# Patient Record
Sex: MALE | Race: WHITE | Employment: OTHER | URBAN - METROPOLITAN AREA
[De-identification: names, ages, dates, MRNs, and addresses within clinical notes are randomized per-mention and may not be internally consistent; named-entity substitution may affect disease eponyms.]

---

## 2017-02-03 ENCOUNTER — TELEPHONE (OUTPATIENT)
Dept: FAMILY MEDICINE | Facility: CLINIC | Age: 67
End: 2017-02-03

## 2017-02-03 NOTE — TELEPHONE ENCOUNTER
Reason for Call:  Other call back    Detailed comments: pt is taking OMEPRAZOLE PO (Discontinued  Pt wants to know if he needs a B12 shot because  Of the medication he is taking. Also wants to know if  Getting the shot is beneficial    Phone Number Patient can be reached at: Home number on file [phone] (home)    Best Time: anytime    Can we leave a detailed message on this number? YES    Call taken on 2/3/2017 at 3:40 PM by Elissa Obrien

## 2017-02-03 NOTE — TELEPHONE ENCOUNTER
"To PCP:  Patient currently in [place] and will be back at the end of April.  States he tried Zantac but it didn't help his GERD symptoms.  Restarted Omeprazole.  Still has many concerns about long time use of Omeprazole.  Also, concerns he is having problems with nutrient (Vitamin) intake especially Vit B12.  Has noted episodes of \"skipped beats\", mainly at rest/night.  [facility] and [facility] WNL. No other testing done.  Feels that the Skipped beats is more prevalent than when first noted.  Asking if Vitamin deficiency could be causing symptom.  Denies any other symptoms associated with skipped beats or B12 deficiency.  Recommended that it would be best to see you when he returns.  He is asking thoughts in the interim.  Please advise.  Thank you.  Elis Devi RN      "

## 2017-02-06 NOTE — TELEPHONE ENCOUNTER
Patient notified with information noted below from provider and patient states that he did schedule an appointment with cardiology in [place] to follow up with this concern due to his location.   Sivan Sow RN  [facility] [employer]

## 2017-02-24 ENCOUNTER — TRANSFERRED RECORDS (OUTPATIENT)
Dept: HEALTH INFORMATION MANAGEMENT | Facility: CLINIC | Age: 67
End: 2017-02-24

## 2017-02-24 LAB
ALT SERPL-CCNC: 23 IU/L (ref 9–46)
AST SERPL-CCNC: 19 IU/L (ref 10–35)
CHOLEST SERPL-MCNC: 209 MG/DL (ref 125–200)
CREAT SERPL-MCNC: 0.98 MG/DL (ref 0.7–1.25)
GFR SERPL CREATININE-BSD FRML MDRD: 79 ML/MIN/1.73M2
GLUCOSE SERPL-MCNC: 88 MG/DL (ref 65–99)
HDLC SERPL-MCNC: 67 MG/DL (ref 40–199)
LDLC SERPL CALC-MCNC: 130 MG/DL
NONHDLC SERPL-MCNC: 142 MG/DL
POTASSIUM SERPL-SCNC: 4.7 MMOL/L (ref 3.5–5.3)
TRIGL SERPL-MCNC: 61 MG/DL
TSH SERPL-ACNC: 2.48 MIU/L (ref 0.4–4.5)

## 2017-05-10 ENCOUNTER — TELEPHONE (OUTPATIENT)
Dept: FAMILY MEDICINE | Facility: CLINIC | Age: 67
End: 2017-05-10

## 2017-05-10 DIAGNOSIS — K21.9 GASTROESOPHAGEAL REFLUX DISEASE, ESOPHAGITIS PRESENCE NOT SPECIFIED: Primary | ICD-10-CM

## 2017-05-10 NOTE — TELEPHONE ENCOUNTER
Reason for Call:  Other call back    Detailed comments: Pt called in regards to potentially getting a referral to a GI specialist recommended by Dr. Cruz. He also has questions in regards to his omeprazole. I asked him whether he wanted to make an appointment to see Dr. Cruz or if he just wanted a referral out. Pt is not against making an appointment to see Dr. Cruz, but would like a phone call back in regards to making an appointment or getting that referral.     Phone Number Patient can be reached at: Home number on file [phone] (home)    Best Time:     Can we leave a detailed message on this number? YES    Call taken on 5/10/2017 at 2:48 PM by Marlen Huff

## 2017-05-10 NOTE — TELEPHONE ENCOUNTER
To PCP:  Do you need to see patient for GI Referral?  Patient would like to see a GI specialist for GERD symptoms.  States since he has been on PPI for a long time, he would prefer to see GI to ensure long term GERD caused any new problems.  States he has been experiencing intermittent stomach aches that can last up to 5 days.   Denies pain, nausea, fevers.  Further states he saw ENT back when that thought cough, etc was due to GERD and recommended PPI.  Please advise.  Thank you.  Elis Devi RN

## 2017-06-06 ENCOUNTER — HOSPITAL ENCOUNTER (OUTPATIENT)
Facility: CLINIC | Age: 67
Discharge: HOME | End: 2017-06-06
Attending: SPECIALIST | Admitting: SPECIALIST
Payer: MEDICARE

## 2017-06-06 ENCOUNTER — SURGERY (OUTPATIENT)
Age: 67
End: 2017-06-06

## 2017-06-06 ENCOUNTER — TRANSFERRED RECORDS (OUTPATIENT)
Dept: HEALTH INFORMATION MANAGEMENT | Facility: CLINIC | Age: 67
End: 2017-06-06

## 2017-06-06 VITALS
OXYGEN SATURATION: 95 % | SYSTOLIC BLOOD PRESSURE: 99 MMHG | HEIGHT: 74 IN | DIASTOLIC BLOOD PRESSURE: 61 MMHG | BODY MASS INDEX: 20.79 KG/M2 | WEIGHT: 162 LBS | RESPIRATION RATE: 15 BRPM

## 2017-06-06 LAB — UPPER GI ENDOSCOPY: NORMAL

## 2017-06-06 PROCEDURE — G0500 MOD SEDAT ENDO SERVICE >5YRS: HCPCS | Performed by: SPECIALIST

## 2017-06-06 PROCEDURE — 88305 TISSUE EXAM BY PATHOLOGIST: CPT | Performed by: SPECIALIST

## 2017-06-06 PROCEDURE — 88305 TISSUE EXAM BY PATHOLOGIST: CPT | Mod: 26 | Performed by: SPECIALIST

## 2017-06-06 PROCEDURE — 43239 EGD BIOPSY SINGLE/MULTIPLE: CPT | Performed by: SPECIALIST

## 2017-06-06 PROCEDURE — 25000125 ZZHC RX 250: Performed by: SPECIALIST

## 2017-06-06 PROCEDURE — 25000128 H RX IP 250 OP 636: Performed by: SPECIALIST

## 2017-06-06 PROCEDURE — A9270 NON-COVERED ITEM OR SERVICE: HCPCS | Performed by: SPECIALIST

## 2017-06-06 RX ORDER — ONDANSETRON 2 MG/ML
4 INJECTION INTRAMUSCULAR; INTRAVENOUS
Status: DISCONTINUED | OUTPATIENT
Start: 2017-06-06 | End: 2017-06-06 | Stop reason: HOSPADM

## 2017-06-06 RX ORDER — LIDOCAINE 40 MG/G
CREAM TOPICAL
Status: DISCONTINUED | OUTPATIENT
Start: 2017-06-06 | End: 2017-06-06 | Stop reason: HOSPADM

## 2017-06-06 RX ORDER — FENTANYL CITRATE 50 UG/ML
INJECTION, SOLUTION INTRAMUSCULAR; INTRAVENOUS PRN
Status: DISCONTINUED | OUTPATIENT
Start: 2017-06-06 | End: 2017-06-06 | Stop reason: HOSPADM

## 2017-06-06 RX ADMIN — FENTANYL CITRATE 25 MCG: 50 INJECTION, SOLUTION INTRAMUSCULAR; INTRAVENOUS at 10:47

## 2017-06-06 RX ADMIN — FENTANYL CITRATE 25 MCG: 50 INJECTION, SOLUTION INTRAMUSCULAR; INTRAVENOUS at 10:41

## 2017-06-06 RX ADMIN — FENTANYL CITRATE 50 MCG: 50 INJECTION, SOLUTION INTRAMUSCULAR; INTRAVENOUS at 10:38

## 2017-06-06 RX ADMIN — MIDAZOLAM HYDROCHLORIDE 0.5 MG: 1 INJECTION, SOLUTION INTRAMUSCULAR; INTRAVENOUS at 10:41

## 2017-06-06 RX ADMIN — MIDAZOLAM HYDROCHLORIDE 0.5 MG: 1 INJECTION, SOLUTION INTRAMUSCULAR; INTRAVENOUS at 10:47

## 2017-06-06 RX ADMIN — SIMETHICONE 2 ML: 63.3; 3.7 SOLUTION/ DROPS ORAL at 10:14

## 2017-06-06 RX ADMIN — MIDAZOLAM HYDROCHLORIDE 1 MG: 1 INJECTION, SOLUTION INTRAMUSCULAR; INTRAVENOUS at 10:38

## 2017-06-06 NOTE — BRIEF OP NOTE
[facility] Brief Operative Note    Pre-operative diagnosis: reflux   Post-operative diagnosis Focally irregular Z line, otherwise normal.      Procedure: Procedure(s):  gastroscopy - Wound Class: II-Clean Contaminated   Surgeon(s): Surgeon(s) and Role:     * Jefferson Palmer MD - Primary   Estimated blood loss: * No values recorded between 6/6/2017 12:00 AM and 6/6/2017 10:58 AM *    Specimens:   ID Type Source Tests Collected by Time Destination   A : squamo columnar Biopsy Esophagus SURGICAL PATHOLOGY EXAM Jefferson Palmer MD 6/6/2017 10:48 AM       Findings: Please see ProVation procedure note in Chart Review

## 2017-06-07 LAB — COPATH REPORT: NORMAL

## 2017-06-14 ENCOUNTER — TELEPHONE (OUTPATIENT)
Dept: FAMILY MEDICINE | Facility: CLINIC | Age: 67
End: 2017-06-14

## 2017-06-14 NOTE — TELEPHONE ENCOUNTER
I called patient and scheduled patient for tomorrow with Edgardo  Patient would like to have B12 lab drawn tomorrow as he does not believe that has ever been do  Patient understands that Edgardo would be the one to order if appropriate.     Patient also anxiously awaiting EGD results. Looks like they are finalized under Procedure tab  Would like to know results once Dr. Cruz has time to review.   Will route to Dr. Cruz about EGD results.     Adamaris Holcomb RN

## 2017-06-14 NOTE — TELEPHONE ENCOUNTER
"I called patient and spoke with him.   Reporting of lightheadedness that is not constant  He notices lightheadedness/\"pressure headache\"/\"jolt to head\" when he bends down, coughs, lowers head low to ground, and puts pressure on chest  Noticed symptoms about 1 month ago, but seems to be happening more frequently over last 1 week.   The lightheadedness/\"pressure headache\"/\"jolt to head\" resolves as soon as he stands back up. Only a matter of seconds.   Patient does take daily walks for about 1/2 hour and has no issue with that.   Does report of chest discomfort, but attributing it to endoscopy procedure that was done last Tuesday.   No feeling of heart palpitations but again says that he had palpitations back in January but that they resolved.   No weakness or numbness down one side of body  No SOB    He has been taking omeprazole for 3 years and would like to try and go off of it to see if it could be causing these episodes of lightheadedness  Patient wonders if he is getting enough B 12?    Patient would like to see Dr. Cruz sooner rather than later to go over Endoscopy results  I offered Team appointment, but patient would prefer to see Dr. Cruz due to many concerns and would like to go over results of Endoscopy once Dr. Cruz has a chance to review.     Dr. Cruz, please advise.     Adamaris Holcomb RN        "

## 2017-06-14 NOTE — TELEPHONE ENCOUNTER
"Reason for call:  Patient reporting a symptom    Symptom or request: Lightheaded and pressure headache \"like a flash\" when he bends over and has pressure on stomach/chest area.     Duration (how long have symptoms been present): about a month    Have you been treated for this before? No    Additional comments: first appointment not until 7/21.      Phone Number patient can be reached at:  Cell number on file:    Telephone Information:   Mobile [phone]     Best Time:  any    Can we leave a detailed message on this number:  YES    Call taken on 6/14/2017 at 10:17 AM by Raine Tejada    "

## 2017-06-14 NOTE — TELEPHONE ENCOUNTER
I might be able to see him next week, in a same day slot, but with those symptoms he should be seen sooner, by team provider tomorrow or next (sorry my schedule is very full)

## 2017-06-15 ENCOUNTER — OFFICE VISIT (OUTPATIENT)
Dept: FAMILY MEDICINE | Facility: CLINIC | Age: 67
End: 2017-06-15
Payer: COMMERCIAL

## 2017-06-15 VITALS
DIASTOLIC BLOOD PRESSURE: 70 MMHG | TEMPERATURE: 96.8 F | SYSTOLIC BLOOD PRESSURE: 109 MMHG | HEIGHT: 74 IN | BODY MASS INDEX: 21.46 KG/M2 | HEART RATE: 64 BPM | WEIGHT: 167.2 LBS | OXYGEN SATURATION: 99 %

## 2017-06-15 DIAGNOSIS — R51.9 PRESSURE IN HEAD: Primary | ICD-10-CM

## 2017-06-15 DIAGNOSIS — K21.9 GASTROESOPHAGEAL REFLUX DISEASE WITHOUT ESOPHAGITIS: ICD-10-CM

## 2017-06-15 DIAGNOSIS — R07.89 CHEST DISCOMFORT: ICD-10-CM

## 2017-06-15 PROCEDURE — 99214 OFFICE O/P EST MOD 30 MIN: CPT | Performed by: NURSE PRACTITIONER

## 2017-06-15 ASSESSMENT — ENCOUNTER SYMPTOMS
DIZZINESS: 1
TINGLING: 0
PALPITATIONS: 1
FEVER: 0
HEADACHES: 1
MEMORY LOSS: 1
INSOMNIA: 0
SHORTNESS OF BREATH: 1
ROS GI COMMENTS: HX OF GERD.

## 2017-06-15 NOTE — PROGRESS NOTES
"HPI    SUBJECTIVE:                                                    Dizziness  Urbano Magallanes is a 66 year old male who presents to clinic today for the following health issues:    Chief Complaint   Patient presents with     Dizziness     pressure headache x 1month, seen a cardiologist due to skipping in [facility] and they did a work up on him in March, he has slight discomfort in chest mid chest area       Lightheadedness    Duration: 1 month    Description   Feeling faint:  no   Feeling like the surroundings are moving: no   Loss of consciousness or falls: no     Intensity:  moderate    Accompanying signs and symptoms:   Nausea/vomitting: no   Palpitations: no   Weakness in arms or legs: no   Vision or speech changes: no   Ringing in ears (Tinnitus): YES- has tinnitus past 4 years  Hearing loss related to dizziness: no   Other (fevers/chills/sweating/dyspnea): no     History (similar episodes/head trauma/previous evaluation/recent bleeding): None    Precipitating or alleviating factors (new meds/chemicals): None  Worse with activity/head movement: no     Therapies tried and outcome: Ibuprofen/aspirin     Since last year, he noticed his heart skipped beat intermittently. While exercising, he took his pulse [id number] and his heart rate continued to skip beats. His symptoms got worse last winter when he laid down. He saw a cardiologist in March 2017 in [place] and they did echocardiogram and found \"a leak\" that was not concerning. His other labs were normal but wonders why his B12 level was not tested.  He started taking supplements on his own and his abnormal heart rythm went away.   Last month, he started to feel chest discomfort that got worse last week. He gets it daily for several hours and it resolves on its own. Nothing specific brings it on. The pain is specifically on the lower sternum. At the moment, he denies chest pain, palpitation and SOB at rest, but he still has slight chest discomfort.  He reported " "some SOB with activities that is mild.   [facility]. He eats healthy and tries to eat 4 hrs before bed. He had a recent endoscopy which has exacerbated his chest pain. [facility] and [place] will be scanned in chart.   He also complaint of pressure headache for 3 weeks. Coughing and chest pressure makes his headache worse. No hx migraines but has had some HA in the past. Only gets it if he does something that causes it. Bending over causes the pressure and lightheadedness. This is a global pain rated about 8/10 lasting for about 30 seconds, then significantly decreases but doesn't resolve and lingers for a few hours and \"isn't normal.\" He denies vision changes, diet changes, b/b changes , paresthesias and no recent URI but might have wax in his ears. His balance is slightly off. He has occasional stress and it is slightly worse this month.  He sleeps 7 hrs everyday. He thinks he has some more forgetfulness as well.    He is concern about getting acne on his face in the last 9 months.     Past Medical History:   Diagnosis Date     Cancer (H)     skin basal     GERD (gastroesophageal reflux disease)      Postoperative hernia     post hernia syndrome      Past Surgical History:   Procedure Laterality Date     APPENDECTOMY       ESOPHAGOSCOPY, GASTROSCOPY, DUODENOSCOPY (EGD), COMBINED N/A 6/6/2017    Procedure: COMBINED ESOPHAGOSCOPY, GASTROSCOPY, DUODENOSCOPY (EGD), BIOPSY SINGLE OR MULTIPLE;  gastroscopy;  Surgeon: Jefferson Palmer MD;  Location:  GI     HERNIA REPAIR  1982, 2003, 2015    [facility]      MOHS MICROGRAPHIC PROCEDURE      right scalp; basal cell carcinoma     Social History   Substance Use Topics     Smoking status: Never Smoker     Smokeless tobacco: Not on file     Alcohol use 0.0 oz/week     0 Standard drinks or equivalent per week      Comment: 1/week     Current Outpatient Prescriptions   Medication Sig Dispense Refill     OMEPRAZOLE PO    " "    ranitidine (ZANTAC) 150 MG tablet Take 1 tablet (150 mg) by mouth 2 times daily 180 tablet 3     VITAMIN D, CHOLECALCIFEROL, PO Take by mouth daily       Glucosamine HCl-MSM (MSM GLUCOSAMINE PO)        LOW-DOSE ASPIRIN PO        Allergies   Allergen Reactions     No Known Allergies        Reviewed and updated as needed this visit by clinical staff and provider      Review of Systems   Constitutional: Negative for fever.   HENT: Negative for congestion and sore throat.    Eyes: Negative.         No vision changes   Respiratory: Positive for shortness of breath.         SOB with activities.    Cardiovascular: Positive for chest pain and palpitations. Negative for leg swelling.        Not currently having chest pain and palpitation.   Gastrointestinal: Negative for nausea.        Hx of GERD.   Musculoskeletal: Negative for neck pain.   Neurological: Positive for dizziness and headaches. Negative for tingling.        Some dizziness   Psychiatric/Behavioral: Positive for memory loss. The patient does not have insomnia.          /70 (BP Location: Right arm, Patient Position: Chair, Cuff Size: Adult Regular)  Pulse 64  Temp 96.8  F (36  C) (Oral)  Ht 6' 2\" (1.88 m)  Wt 167 lb 3.2 oz (75.8 kg)  SpO2 99%  BMI 21.47 kg/m2      Physical Exam   Constitutional: He is oriented to person, place, and time and well-developed, well-nourished, and in no distress.   HENT:   Head: Normocephalic.   Right Ear: Tympanic membrane, external ear and ear canal normal.   Left Ear: Tympanic membrane, external ear and ear canal normal.   Eyes: Conjunctivae and EOM are normal. Pupils are equal, round, and reactive to light.   Neck: Normal range of motion.   Cardiovascular: Normal rate, regular rhythm and normal heart sounds.    No murmur heard.  Pulmonary/Chest: Effort normal and breath sounds normal. No respiratory distress.   Musculoskeletal: Normal range of motion. He exhibits no edema.   Neurological: He is alert and oriented " to person, place, and time. No cranial nerve deficit. Gait normal. Coordination normal.   Skin: Skin is warm and dry.   Psychiatric: Mood and affect normal.         Assessment and Plan:       ICD-10-CM    1. Pressure in head R51 MRA Angiogram Head w/o Contrast   2. Chest discomfort R07.89    3. Gastroesophageal reflux disease without esophagitis K21.9        Unknown etiology of multiple symptoms   Very unlikely to be any emergent etiology considering the historical features and longevity   Discussed option for MRI today to look for aneurysm, but he would prefer to watch and wait which is reasonable. He has a chronically low BP so likelihood is rather low to have an aneurysm.   Recommend that he f/u within 1 month with PCP for recheck is symptoms persist or sooner with worsening symptoms         BUDDY Pate, CNP  [facility]

## 2017-06-15 NOTE — TELEPHONE ENCOUNTER
Patient has a.m appointment with EYAD Bryan.  Can we make sure he gets the message below from  re: GERD?  Thank you,   Azucena Botello RN

## 2017-06-15 NOTE — MR AVS SNAPSHOT
"              After Visit Summary   6/15/2017    Urbano Magallanes    MRN: [id number]           Patient Information     Date Of Birth          1950        Visit Information        Provider Department      6/15/2017 9:30 AM Edgardo Murrieta APRN CNP [facility]        Today's Diagnoses     Pressure in head    -  1    Chest discomfort        Gastroesophageal reflux disease without esophagitis           Follow-ups after your visit        Who to contact     If you have questions or need follow up information about today's clinic visit or your schedule please contact [facility] directly at [phone].  Normal or non-critical lab and imaging results will be communicated to you by Womaihart, letter or phone within 4 business days after the clinic has received the results. If you do not hear from us within 7 days, please contact the clinic through Womai[employer] or phone. If you have a critical or abnormal lab result, we will notify you by phone as soon as possible.  Submit refill requests through [employer] or call your pharmacy and they will forward the refill request to us. Please allow 3 business days for your refill to be completed.          Additional Information About Your Visit        MyChart Information     In[employer] lets you send messages to your doctor, view your test results, renew your prescriptions, schedule appointments and more. To sign up, go to [email or web address] . Click on \"Log in\" on the left side of the screen, which will take you to the Welcome page. Then click on \"Sign up Now\" on the right side of the page.     You will be asked to enter the access code listed below, as well as some personal information. Please follow the directions to create your username and password.     Your access code is: [email or web address]  Expires: 2017 10:27 AM     Your access code will  in 90 days. If you need help or a new code, please call your [facility] or [phone].        Care " "EveryWhere ID     This is your Care EveryWhere ID. This could be used by other organizations to access your [facility] medical records  [id number]        Your Vitals Were     Pulse Temperature Height Pulse Oximetry BMI (Body Mass Index)       64 96.8  F (36  C) (Oral) 6' 2\" (1.88 m) 99% 21.47 kg/m2        Blood Pressure from Last 3 Encounters:   06/15/17 109/70   06/06/17 99/61   11/14/16 110/62    Weight from Last 3 Encounters:   06/15/17 167 lb 3.2 oz (75.8 kg)   06/06/17 162 lb (73.5 kg)   11/14/16 167 lb (75.8 kg)              Today, you had the following     No orders found for display       Primary Care Provider Office Phone # Fax #    Andrea Cruz [phone]       [facility] [address] [place] [address] [facility] [address]        Equal Access to Services     KASIE FLETCHER : Hadii aad ku hadasho Soomaali, waaxda luqadaha, qaybta kaalmada adeegyada, waxay idiin hayluis manueln kiran chavez . So [facility] [phone].    ATENCIÓN: Si bayronla espalok, tiene a lincoln disposición servicios gratuitos de asistencia lingüística. Llame al [phone].    We comply with applicable federal civil rights laws and [place] laws. We do not discriminate on the basis of race, color, national origin, age, disability sex, sexual orientation or gender identity.            Thank you!     Thank you for choosing [facility]  for your care. Our goal is always to provide you with excellent care. Hearing back from our patients is one way we can continue to improve our services. Please take a few minutes to complete the written survey that you may receive in the mail after your visit with us. Thank you!             Your Updated Medication List - Protect others around you: Learn how to safely use, store and throw away your medicines at www.disposemymeds.org.          This list is accurate as of: 6/15/17 11:59 PM.  Always use your most recent med list.                   Brand Name Dispense Instructions for use " Diagnosis    LOW-DOSE ASPIRIN PO           MSM GLUCOSAMINE PO           OMEPRAZOLE PO           ranitidine 150 MG tablet    ZANTAC    180 tablet    Take 1 tablet (150 mg) by mouth 2 times daily    Gastroesophageal reflux disease without esophagitis       VITAMIN D (CHOLECALCIFEROL) PO      Take by mouth daily

## 2017-06-15 NOTE — TELEPHONE ENCOUNTER
His EGD showed mild finding consistent with GERD (stomah acid reflux)    Stomach acid reflux is the probable cause of his symptoms    Dr. Palmer says he can get off omeprazole if he follows the below protocol:       Recommendations: Since Mr. Magallanes wants to come off omeprazole, I recommend tapering him off; omeprazole on odd days, ranitidine 150 mg orally twice daily on even days for two weeks. If he tolerates that, then use omeprazole Monday and Friday and ranitidine the rest of the week. If there is no recurrence of chest pain, then go to ranitidine 150 mg orally twice daily. If chest pain recurs (and there is no evidence of a cardiac problem), consider esophageal pH monitoring and motility studies. Letters sent

## 2017-06-15 NOTE — NURSING NOTE
"Chief Complaint   Patient presents with     Dizziness     pressure headache x 1month take aspirin for a relieve, seen a cardiologist due to skipping in [facility] and they did a work up on him in March, he has slight discomfort in chest mid chest area       Initial /70 (BP Location: Right arm, Patient Position: Chair, Cuff Size: Adult Regular)  Pulse 64  Temp 96.8  F (36  C) (Oral)  Ht 6' 2\" (1.88 m)  Wt 167 lb 3.2 oz (75.8 kg)  SpO2 99%  BMI 21.47 kg/m2 Estimated body mass index is 21.47 kg/(m^2) as calculated from the following:    Height as of this encounter: 6' 2\" (1.88 m).    Weight as of this encounter: 167 lb 3.2 oz (75.8 kg).  Medication Reconciliation: complete     KG Rocha      "

## 2017-06-22 ASSESSMENT — ENCOUNTER SYMPTOMS
SORE THROAT: 0
EYES NEGATIVE: 1
NAUSEA: 0
NECK PAIN: 0

## 2017-07-26 ENCOUNTER — HOSPITAL ENCOUNTER (OUTPATIENT)
Dept: MRI IMAGING | Facility: CLINIC | Age: 67
Discharge: HOME | End: 2017-07-26
Attending: NURSE PRACTITIONER | Admitting: NURSE PRACTITIONER
Payer: MEDICARE

## 2017-07-26 ENCOUNTER — OFFICE VISIT (OUTPATIENT)
Dept: FAMILY MEDICINE | Facility: CLINIC | Age: 67
End: 2017-07-26
Payer: COMMERCIAL

## 2017-07-26 VITALS
OXYGEN SATURATION: 99 % | SYSTOLIC BLOOD PRESSURE: 118 MMHG | TEMPERATURE: 98 F | WEIGHT: 167.3 LBS | BODY MASS INDEX: 21.48 KG/M2 | HEART RATE: 60 BPM | DIASTOLIC BLOOD PRESSURE: 66 MMHG

## 2017-07-26 DIAGNOSIS — R51.9 HEADACHE, UNSPECIFIED HEADACHE TYPE: Primary | ICD-10-CM

## 2017-07-26 DIAGNOSIS — H92.01 RIGHT EAR PAIN: ICD-10-CM

## 2017-07-26 DIAGNOSIS — R11.0 NAUSEA: ICD-10-CM

## 2017-07-26 DIAGNOSIS — R51.9 HEADACHE, UNSPECIFIED HEADACHE TYPE: ICD-10-CM

## 2017-07-26 LAB
ANION GAP SERPL CALCULATED.3IONS-SCNC: 8 MMOL/L (ref 3–14)
BUN SERPL-MCNC: 16 MG/DL (ref 7–30)
CALCIUM SERPL-MCNC: 8.7 MG/DL (ref 8.5–10.1)
CHLORIDE SERPL-SCNC: 104 MMOL/L (ref 94–109)
CO2 SERPL-SCNC: 27 MMOL/L (ref 20–32)
CREAT SERPL-MCNC: 0.87 MG/DL (ref 0.66–1.25)
ERYTHROCYTE [DISTWIDTH] IN BLOOD BY AUTOMATED COUNT: 12.5 % (ref 10–15)
GFR SERPL CREATININE-BSD FRML MDRD: 87 ML/MIN/1.7M2
GLUCOSE SERPL-MCNC: 117 MG/DL (ref 70–99)
HCT VFR BLD AUTO: 43.5 % (ref 40–53)
HGB BLD-MCNC: 14.5 G/DL (ref 13.3–17.7)
MCH RBC QN AUTO: 30.8 PG (ref 26.5–33)
MCHC RBC AUTO-ENTMCNC: 33.3 G/DL (ref 31.5–36.5)
MCV RBC AUTO: 92 FL (ref 78–100)
PLATELET # BLD AUTO: 183 10E9/L (ref 150–450)
POTASSIUM SERPL-SCNC: 3.9 MMOL/L (ref 3.4–5.3)
RBC # BLD AUTO: 4.71 10E12/L (ref 4.4–5.9)
SODIUM SERPL-SCNC: 139 MMOL/L (ref 133–144)
WBC # BLD AUTO: 7.7 10E9/L (ref 4–11)

## 2017-07-26 PROCEDURE — 80048 BASIC METABOLIC PNL TOTAL CA: CPT | Performed by: NURSE PRACTITIONER

## 2017-07-26 PROCEDURE — 25000128 H RX IP 250 OP 636: Performed by: NURSE PRACTITIONER

## 2017-07-26 PROCEDURE — 99214 OFFICE O/P EST MOD 30 MIN: CPT | Performed by: NURSE PRACTITIONER

## 2017-07-26 PROCEDURE — 36415 COLL VENOUS BLD VENIPUNCTURE: CPT | Performed by: NURSE PRACTITIONER

## 2017-07-26 PROCEDURE — A9585 GADOBUTROL INJECTION: HCPCS | Performed by: NURSE PRACTITIONER

## 2017-07-26 PROCEDURE — 93000 ELECTROCARDIOGRAM COMPLETE: CPT | Performed by: NURSE PRACTITIONER

## 2017-07-26 PROCEDURE — 70553 MRI BRAIN STEM W/O & W/DYE: CPT

## 2017-07-26 PROCEDURE — 85027 COMPLETE CBC AUTOMATED: CPT | Performed by: NURSE PRACTITIONER

## 2017-07-26 RX ORDER — GADOBUTROL 604.72 MG/ML
7.5 INJECTION INTRAVENOUS ONCE
Status: COMPLETED | OUTPATIENT
Start: 2017-07-26 | End: 2017-07-26

## 2017-07-26 RX ADMIN — GADOBUTROL 7.5 ML: 604.72 INJECTION INTRAVENOUS at 16:22

## 2017-07-26 NOTE — MR AVS SNAPSHOT
After Visit Summary   7/26/2017    Urbano Magallanes    MRN: [id number]           Patient Information     Date Of Birth          1950        Visit Information        Provider Department      7/26/2017 1:00 PM Edgardo Murrieta APRN [facility]        Today's Diagnoses     Headache, unspecified headache type    -  1    Nausea        Right ear pain          Care Instructions    Please follow up for the MRI   I will call you when I get results  If you get any new symptoms, please go to the ER           Follow-ups after your visit        Your next 10 appointments already scheduled     Jul 26, 2017  3:30 PM CDT   MR BRAIN W/O & W CONTRAST with RHMR1   [facility] ([facility])    [address] [facility] [place] [address] [phone]           Take your medicines as usual, unless your doctor tells you not to. Bring a list of your current medicines to your exam (including vitamins, minerals and over-the-counter drugs).  You will be given intravenous contrast for this exam. To prepare:   The day before your exam, drink extra fluids at least six 8-ounce glasses (unless your doctor tells you to restrict your fluids).   Have a blood test (creatinine test) within 30 days of your exam. Go to your clinic or Diagnostic Imaging Department for this test.  The MRI machine uses a strong magnet. Please wear clothes without metal (snaps, zippers). A sweatsuit works well, or we may give you a hospital gown.  Please remove any body piercings and hair extensions before you arrive. You will also remove watches, jewelry, hairpins, wallets, dentures, partial dental plates and hearing aids. You may wear contact lenses, and you may be able to wear your rings. We have a safe place to keep your personal items, but it is safer to leave them at home.   **IMPORTANT** THE INSTRUCTIONS BELOW ARE ONLY FOR THOSE PATIENTS WHO HAVE BEEN TOLD THEY WILL RECEIVE SEDATION OR GENERAL ANESTHESIA DURING  THEIR MRI PROCEDURE:  IF YOU WILL RECEIVE SEDATION (take medicine to help you relax during your exam):   You must get the medicine from your doctor before you arrive. Bring the medicine to the exam. Do not take it at home.   Arrive one hour early. Bring someone who can take you home after the test. Your medicine will make you sleepy. After the exam, you may not drive, take a bus or take a taxi by yourself.   No eating 8 hours before your exam. You may have clear liquids up until 4 hours before your exam. (Clear liquids include water, clear tea, black coffee and fruit juice without pulp.)  IF YOU WILL RECEIVE ANESTHESIA (be asleep for your exam):   Arrive 1 1/2 hours early. Bring someone who can take you home after the test. You may not drive, take a bus or take a taxi by yourself.   No eating 8 hours before your exam. You may have clear liquids up until 4 hours before your exam. (Clear liquids include water, clear tea, black coffee and fruit juice without pulp.)  Please call the Imaging Department at your exam site with any questions.              Future tests that were ordered for you today     Open Future Orders        Priority Expected Expires Ordered    MR Brain w/o & w Contrast Routine  7/27/2018 7/26/2017            Who to contact     If you have questions or need follow up information about today's clinic visit or your schedule please contact [facility] directly at [phone].  Normal or non-critical lab and imaging results will be communicated to you by MyChart, letter or phone within 4 business days after the clinic has received the results. If you do not hear from us within 7 days, please contact the clinic through [employer] or phone. If you have a critical or abnormal lab result, we will notify you by phone as soon as possible.  Submit refill requests through [employer] or call your pharmacy and they will forward the refill request to us. Please allow 3 business days for your refill to be  "completed.          Additional Information About Your Visit        [employer] Information     [employer] lets you send messages to your doctor, view your test results, renew your prescriptions, schedule appointments and more. To sign up, go to [email or web address] . Click on \"Log in\" on the left side of the screen, which will take you to the Welcome page. Then click on \"Sign up Now\" on the right side of the page.     You will be asked to enter the access code listed below, as well as some personal information. Please follow the directions to create your username and password.     Your access code is: [email or web address]  Expires: 2017 10:27 AM     Your access code will  in 90 days. If you need help or a new code, please call your Center clinic or [phone].        Care EveryWhere ID     This is your [facility] EveryWhere ID. This could be used by other organizations to access your Center medical records  [id number]        Your Vitals Were     Pulse Temperature Pulse Oximetry BMI (Body Mass Index)          60 98  F (36.7  C) (Oral) 99% 21.48 kg/m2         Blood Pressure from Last 3 Encounters:   17 118/66   06/15/17 109/70   17 99/61    Weight from Last 3 Encounters:   17 167 lb 4.8 oz (75.9 kg)   06/15/17 167 lb 3.2 oz (75.8 kg)   17 162 lb (73.5 kg)              We Performed the Following     Basic metabolic panel  (Ca, Cl, CO2, Creat, Gluc, K, Na, BUN)     CBC with platelets     EKG 12-lead complete w/read - Clinics          Today's Medication Changes          These changes are accurate as of: 17  2:20 PM.  If you have any questions, ask your nurse or doctor.               Stop taking these medicines if you haven't already. Please contact your care team if you have questions.     ranitidine 150 MG tablet   Commonly known as:  ZANTAC   Stopped by:  Edgardo Murrieta APRN CNP                    Primary Care Provider Office Phone # Fax #    Andrea Cruz [phone] " [phone]       [facility] [address]  [facility] [address]        Equal Access to Services     JOANN FLETCHER : Hadii aad ku haddesirekarlie Velasco, waflacoda luqadaha, qaybta kaсветланаda maureenmeloaldo, waxtj samara ivyalbin reddydianne aleksandarjohntatyana mathews. So [facility] [phone].    ATENCIÓN: Si habla español, tiene a lincoln disposición servicios gratuitos de asistencia lingüística. Llame al [phone].    We comply with applicable federal civil rights laws and [place] laws. We do not discriminate on the basis of race, color, national origin, age, disability sex, sexual orientation or gender identity.            Thank you!     Thank you for choosing [facility]  for your care. Our goal is always to provide you with excellent care. Hearing back from our patients is one way we can continue to improve our services. Please take a few minutes to complete the written survey that you may receive in the mail after your visit with us. Thank you!             Your Updated Medication List - Protect others around you: Learn how to safely use, store and throw away your medicines at www.disposemymeds.org.          This list is accurate as of: 7/26/17  2:20 PM.  Always use your most recent med list.                   Brand Name Dispense Instructions for use Diagnosis    LOW-DOSE ASPIRIN PO           MSM GLUCOSAMINE PO           OMEPRAZOLE PO           VITAMIN D (CHOLECALCIFEROL) PO      Take by mouth daily

## 2017-07-26 NOTE — PROGRESS NOTES
HPI    SUBJECTIVE:                                                    Urbano Magallanes is a 66 year old male who presents to clinic today for the following health issues:      Chief Complaint   Patient presents with     Ear Problem     both ears feel plugged, seems to be causing nausea and HA       Nausea started around 9am today  Has had a persistent blockage of the right ear but today was a lot worse   HA on and off the past few weeks to 2 months. The HA has actually gotten better compared to before. It is most noticeable when he bends over.   Gets the HA 2-3 times a week that lasts up to a couple hours. The pain is usually global but yesterday it was only the right side     Today really feels pressure in the right ear. More noticeable when he opens his jaw    Mild dizziness  No change of vision   Feels slight decrease in hearing   Minimal neck pain   Last Saturday flew back to town   The CP that we discussed previously is now pretty much gone. At that time he had an EGD and also tried getting off the PPI and tried zantac but restarted the PPI 5 days ago which has helped immensely    No RON   No paresthesias   Chronic tinnitus but is slightly worse today   Has a history of vertigo but thought his ears were the problem today and not the vertigo       Past Medical History:   Diagnosis Date     Cancer (H)     skin basal     GERD (gastroesophageal reflux disease)      Postoperative hernia     post hernia syndrome      Past Surgical History:   Procedure Laterality Date     APPENDECTOMY       ESOPHAGOSCOPY, GASTROSCOPY, DUODENOSCOPY (EGD), COMBINED N/A 6/6/2017    Procedure: COMBINED ESOPHAGOSCOPY, GASTROSCOPY, DUODENOSCOPY (EGD), BIOPSY SINGLE OR MULTIPLE;  gastroscopy;  Surgeon: Jefferson Palmer MD;  Location:  GI     HERNIA REPAIR  1982, 2003, 2015    [facility]      MOHS MICROGRAPHIC PROCEDURE      right scalp; basal cell carcinoma     Social History   Substance Use Topics     Smoking status: Never Smoker     Smokeless  tobacco: Not on file     Alcohol use 0.0 oz/week     0 Standard drinks or equivalent per week      Comment: 1/week     Current Outpatient Prescriptions   Medication Sig Dispense Refill     OMEPRAZOLE PO        VITAMIN D, CHOLECALCIFEROL, PO Take by mouth daily       Glucosamine HCl-MSM (MSM GLUCOSAMINE PO)        LOW-DOSE ASPIRIN PO        Allergies   Allergen Reactions     No Known Allergies        Reviewed and updated as needed this visit by clinical staff and provider      ROS  Detailed as above       /66 (BP Location: Right arm, Patient Position: Chair, Cuff Size: Adult Regular)  Pulse 60  Temp 98  F (36.7  C) (Oral)  Wt 167 lb 4.8 oz (75.9 kg)  SpO2 99%  BMI 21.48 kg/m2    Physical Exam   Constitutional: He is oriented to person, place, and time and well-developed, well-nourished, and in no distress. He has a sickly appearance.   HENT:   Head: Normocephalic.   Right Ear: Tympanic membrane, external ear and ear canal normal.   Left Ear: Tympanic membrane, external ear and ear canal normal.   Nose: Mucosal edema (left) present.   Eyes: Conjunctivae and EOM are normal. Pupils are equal, round, and reactive to light.   Neck: Normal range of motion.   Cardiovascular: Normal rate, regular rhythm and normal heart sounds.    No murmur heard.  Pulmonary/Chest: Effort normal.   Musculoskeletal: Normal range of motion.   Lymphadenopathy:     He has no cervical adenopathy.   Neurological: He is alert and oriented to person, place, and time. He has normal sensation and normal strength. No cranial nerve deficit. He has a normal Cerebellar Exam and a normal Finger-Nose-Finger Test.   Skin: Skin is warm and dry. No rash noted. There is pallor.   Psychiatric: Mood and affect normal.       Assessment and Plan:       ICD-10-CM    1. Headache, unspecified headache type R51 EKG 12-lead complete w/read - Clinics     Basic metabolic panel  (Ca, Cl, CO2, Creat, Gluc, K, Na, BUN)     CBC with platelets     MR Brain w/o & w  Contrast   2. Nausea R11.0 EKG 12-lead complete w/read - Clinics     Basic metabolic panel  (Ca, Cl, CO2, Creat, Gluc, K, Na, BUN)     CBC with platelets     MR Brain w/o & w Contrast   3. Right ear pain H92.01 EKG 12-lead complete w/read - Clinics     Basic metabolic panel  (Ca, Cl, CO2, Creat, Gluc, K, Na, BUN)     CBC with platelets     MR Brain w/o & w Contrast       Concerning symptoms today with this significant right ear discomfort/pressure along with hearing loss and nausea   He doesn't look quite right to me today, as I have seen him in the past, so thorough workup is completed as ordered above   EKG does have mild inferior lead t-wave inversion but overall there is nothing acute. He hasn't had active chest symptoms in the recent past, so do not think this is r/t acute cardiac etiology  We will complete an MRI today. Most concerning ddx includes mass  May refer to ENT pending results.       Edgardo Murrieta, APRN, CNP  [facility]

## 2017-07-26 NOTE — PATIENT INSTRUCTIONS
Please follow up for the MRI   I will call you when I get results  If you get any new symptoms, please go to the ER

## 2017-07-26 NOTE — NURSING NOTE
"Chief Complaint   Patient presents with     Ear Problem     both ears feel plugged, seems to be causing nausea and HA       Initial /66 (BP Location: Right arm, Patient Position: Chair, Cuff Size: Adult Regular)  Pulse 60  Temp 98  F (36.7  C) (Oral)  Wt 167 lb 4.8 oz (75.9 kg)  SpO2 99%  BMI 21.48 kg/m2 Estimated body mass index is 21.48 kg/(m^2) as calculated from the following:    Height as of 6/15/17: 6' 2\" (1.88 m).    Weight as of this encounter: 167 lb 4.8 oz (75.9 kg).  Medication Reconciliation: complete     Juan Carlos Case, TESFAYE     "

## 2017-08-01 ENCOUNTER — HOSPITAL ENCOUNTER (EMERGENCY)
Facility: CLINIC | Age: 67
Discharge: HOME | End: 2017-08-01
Attending: EMERGENCY MEDICINE | Admitting: EMERGENCY MEDICINE
Payer: MEDICARE

## 2017-08-01 VITALS
SYSTOLIC BLOOD PRESSURE: 127 MMHG | OXYGEN SATURATION: 100 % | TEMPERATURE: 97.6 F | HEART RATE: 67 BPM | HEIGHT: 74 IN | DIASTOLIC BLOOD PRESSURE: 56 MMHG | RESPIRATION RATE: 16 BRPM | BODY MASS INDEX: 20.79 KG/M2 | WEIGHT: 162 LBS

## 2017-08-01 DIAGNOSIS — H93.11 TINNITUS, RIGHT: ICD-10-CM

## 2017-08-01 DIAGNOSIS — R05.9 COUGH: ICD-10-CM

## 2017-08-01 PROBLEM — Z86.018 HISTORY OF DYSPLASTIC NEVUS: Status: ACTIVE | Noted: 2017-07-03

## 2017-08-01 PROBLEM — Z85.828 HISTORY OF BASAL CELL CARCINOMA: Status: ACTIVE | Noted: 2017-07-03

## 2017-08-01 PROCEDURE — 99283 EMERGENCY DEPT VISIT LOW MDM: CPT

## 2017-08-01 RX ORDER — PREDNISONE 20 MG/1
TABLET ORAL
Qty: 10 TABLET | Refills: 0 | Status: SHIPPED | OUTPATIENT
Start: 2017-08-01 | End: 2017-08-07

## 2017-08-01 RX ORDER — GUAIFENESIN 200 MG/1
400 TABLET ORAL EVERY 4 HOURS PRN
Qty: 60 TABLET | Refills: 0 | Status: SHIPPED | OUTPATIENT
Start: 2017-08-01 | End: 2017-08-07

## 2017-08-01 RX ORDER — BENZONATATE 200 MG/1
200 CAPSULE ORAL 3 TIMES DAILY PRN
Qty: 21 CAPSULE | Refills: 0 | Status: SHIPPED | OUTPATIENT
Start: 2017-08-01 | End: 2017-08-07

## 2017-08-01 ASSESSMENT — ENCOUNTER SYMPTOMS
COUGH: 1
DIAPHORESIS: 1
HEADACHES: 0
FEVER: 1
DIARRHEA: 0

## 2017-08-01 NOTE — ED AVS SNAPSHOT
Emergency Department    [address] [place] [address]    Phone:  [phone]    Fax:  [phone]                                       Urbano Magallanes   MRN: [id number]    Department:   Emergency Department   Date of Visit:  8/1/2017           After Visit Summary Signature Page     I have received my discharge instructions, and my questions have been answered. I have discussed any challenges I see with this plan with the nurse or doctor.    ..........................................................................................................................................  Patient/Patient Representative Signature      ..........................................................................................................................................  Patient Representative Print Name and Relationship to Patient    ..................................................               ................................................  Date                                            Time    ..........................................................................................................................................  Reviewed by Signature/Title    ...................................................              ..............................................  Date                                                            Time

## 2017-08-01 NOTE — ED AVS SNAPSHOT
Emergency Department    [address] [facility] [address]    Phone:  [phone]    Fax:  [phone]                                       Urbano Magallanes   MRN: [id number]    Department:   Emergency Department   Date of Visit:  8/1/2017           Patient Information     Date Of Birth          1950        Your diagnoses for this visit were:     Cough     Tinnitus, right        You were seen by Ayesha Vick MD.      Follow-up Information     Follow up with  Emergency Department.    Specialty:  EMERGENCY MEDICINE    Why:  immediately , If symptoms worsen    Contact information:    [address] [facility] [address] [phone]        Follow up with Andrea Cruz MD In 3 days.    Specialty:  Internal Medicine    Why:  for recheck of your symptoms    Contact information:    [facility]  [address] [facility] [address] [phone]          Follow up with Neurology, [facility] In 1 week.    Why:  for follow up regarding headaches    Contact information:    [address]  [address]  [facility] [address] [phone]          Discharge Instructions       Discharge Instructions  Bronchitis, Pneumonia, Bronchospasm    You were seen today for a chest infection or inflammation. If your doctor decided this was due to a bacterial infection, you may need an antibiotic. Sometimes these are caused by a virus, and then an antibiotic will not help.     Return to the Emergency Department if:    Your breathing gets much worse.    You are very weak, or feel much more ill.    You develop new symptoms, such as chest pain.    You cough up blood.    You are vomiting enough that you can t keep fluids or your medicine down.    What can I do to help myself?    Fill any prescriptions the doctor gave you and take them right away--especially antibiotics. Be sure to finish the whole antibiotic prescription.    You may be given a prescription for an inhaler, which  "can help loosen tight air passages.  Use this as needed, but not more often than directed. Inhalers work much better when used with a spacer.     You may be given a prescription for a steroid to reduce inflammation. Used long-term, these can have many serious side effects, but for short courses these do not happen. You may notice restlessness or increased appetite.        You may use non-prescription cough or cold medicines. Cough medicines may help, but don t make the cough go away completely.     Avoid smoke, because this can make your symptoms worse. If you smoke, this may be a good time to quit! Consider using nicotine lozenges, gum, or patches to reduce cravings.     If you have a fever, Tylenol  (acetaminophen), Motrin  (ibuprofen), or Advil  (ibuprofen) may help bring fever down and may help you feel more comfortable. Be sure to read and follow the package directions, and ask your doctor if you have questions.    Be sure to get your flu shot each year.  The pneumonia shot can help prevent pneumonia.  Probiotics: If you have been given an antibiotic, you may want to also take a probiotic pill or eat yogurt with live cultures. Probiotics have \"good bacteria\" to help your intestines stay healthy. Studies have shown that probiotics help prevent diarrhea and other intestine problems (including C. diff infection) when you take antibiotics. You can buy these without a prescription in the pharmacy section of the store.     If your doctor has told you to follow-up at your clinic, be sure to call right away and go to your appointment.  If there is any problem with keeping your appointment, call your doctor or return to the Emergency Department.    If you were given a prescription for medicine here today, be sure to read all of the information (including the package insert) that comes with your prescription.  This will include important information about the medicine, its side effects, and any warnings that you need to " know about.  The pharmacist who fills the prescription can provide more information and answer questions you may have about the medicine.  If you have questions or concerns that the pharmacist cannot address, please call or return to the Emergency Department.         24 Hour Appointment Hotline       To make an appointment at any [facility], call [phone] ([phone]). If you don't have a family doctor or clinic, we will help you find one. [facility] clinics are conveniently located to serve the needs of you and your family.             Review of your medicines      START taking        Dose / Directions Last dose taken    benzonatate 200 MG capsule   Commonly known as:  TESSALON   Dose:  200 mg   Quantity:  21 capsule        Take 1 capsule (200 mg) by mouth 3 times daily as needed for cough   Refills:  0        guaiFENesin 200 MG Tabs tablet   Commonly known as:  ORGANIDIN   Dose:  400 mg   Quantity:  60 tablet        Take 2 tablets (400 mg) by mouth every 4 hours as needed for cough   Refills:  0        predniSONE 20 MG tablet   Commonly known as:  DELTASONE   Quantity:  10 tablet        Take two tablets (= 40mg) each day for 5 (five) days   Refills:  0          Our records show that you are taking the medicines listed below. If these are incorrect, please call your family doctor or clinic.        Dose / Directions Last dose taken    LOW-DOSE ASPIRIN PO        Refills:  0        MSM GLUCOSAMINE PO        Refills:  0        OMEPRAZOLE PO        Refills:  0        VITAMIN D (CHOLECALCIFEROL) PO        Take by mouth daily   Refills:  0                Prescriptions were sent or printed at these locations (3 Prescriptions)                   Other Prescriptions                Printed at Department/Unit printer (3 of 3)         benzonatate (TESSALON) 200 MG capsule               guaiFENesin (ORGANIDIN) 200 MG TABS tablet               predniSONE (DELTASONE) 20 MG tablet                Orders Needing Specimen  Collection     None      Pending Results     No orders found from 7/30/2017 to 8/2/2017.            Pending Culture Results     No orders found from 7/30/2017 to 8/2/2017.            Pending Results Instructions     If you had any lab results that were not finalized at the time of your Discharge, you can call the ED Lab Result RN at [phone]. You will be contacted by this team for any positive Lab results or changes in treatment. The nurses are available 7 days a week from 10A to 6:30P.  You can leave a message 24 hours per day and they will return your call.        Test Results From Your Hospital Stay               Clinical Quality Measure: Blood Pressure Screening     Your blood pressure was checked while you were in the emergency department today. The last reading we obtained was  BP: 127/56 . Please read the guidelines below about what these numbers mean and what you should do about them.  If your systolic blood pressure (the top number) is less than 120 and your diastolic blood pressure (the bottom number) is less than 80, then your blood pressure is normal. There is nothing more that you need to do about it.  If your systolic blood pressure (the top number) is 120-139 or your diastolic blood pressure (the bottom number) is 80-89, your blood pressure may be higher than it should be. You should have your blood pressure rechecked within a year by a primary care provider.  If your systolic blood pressure (the top number) is 140 or greater or your diastolic blood pressure (the bottom number) is 90 or greater, you may have high blood pressure. High blood pressure is treatable, but if left untreated over time it can put you at risk for heart attack, stroke, or kidney failure. You should have your blood pressure rechecked by a primary care provider within the next 4 weeks.  If your provider in the emergency department today gave you specific instructions to follow-up with your doctor or provider even sooner than  "that, you should follow that instruction and not wait for up to 4 weeks for your follow-up visit.        Thank you for choosing [facility]       Thank you for choosing [facility] for your care. Our goal is always to provide you with excellent care. Hearing back from our patients is one way we can continue to improve our services. Please take a few minutes to complete the written survey that you may receive in the mail after you visit with us. Thank you!        [employer] Information     [facility] lets you send messages to your doctor, view your test results, renew your prescriptions, schedule appointments and more. To sign up, go to [email or web address] . Click on \"Log in\" on the left side of the screen, which will take you to the Welcome page. Then click on \"Sign up Now\" on the right side of the page.     You will be asked to enter the access code listed below, as well as some personal information. Please follow the directions to create your username and password.     Your access code is: [email or web address]  Expires: 2017 10:27 AM     Your access code will  in 90 days. If you need help or a new code, please call your [facility] clinic or [phone].        Care EveryWhere ID     This is your Care EveryWhere ID. This could be used by other organizations to access your [facility] medical records  [id number]        Equal Access to Services     JOANN FLETCHER : Donn rabagoo Soceline, waaxda luqadaha, qaybta kaalmada evaristo, efe mathews. So [facility] [phone].    ATENCIÓN: Si habla español, tiene a lincoln disposición servicios gratuitos de asistencia lingüística. Ed marcial [phone].    We comply with applicable federal civil rights laws and [place] laws. We do not discriminate on the basis of race, color, national origin, age, disability sex, sexual orientation or gender identity.            After Visit Summary       This is your record. Keep this with you and show to your community " pharmacist(s) and doctor(s) at your next visit.

## 2017-08-01 NOTE — ED PROVIDER NOTES
History     Chief Complaint:  Cold Symptoms     HPI   Urbano Magallanes is a 66 year old male who presents to the emergency department today for evaluation of cold symptoms. The patient reports that he was seen by an NP in clinic on Wednesday, 07/26/2017, for evaluation of an earache and a headache. The patient had imaging as per below and was told that they found nothing focal in his ear. The patient notes that on Thursday he felt better but on Friday he developed some chest congestion and a productive cough with yellow sputum that brought on and aggravated a headache. On Sunday night, the patient reports that he had a fever with some night sweats. The patient also notes that in the past few days he has had ear discomfort, pressure, and ringing in his ears which is above his baseline tinnitus. The patient denies any diarrhea.     07/26/2017 - MRI Brain without and with contrast  1. No evidence of acute infarct, mass, hemorrhage, or herniation. No  etiology identified to account for hearing loss.  2. Unremarkable appearance of the 7th and 8th cranial nerves  bilaterally.  3. Few periventricular white matter T2 hyperintensities which are  likely of little clinical significance.  TATO RODAS MD  Reading per radiology    Allergies:  No Known Drug Allergies    Medications:    Omeprazole  Cholecalciferol  Glucosamine  Aspirin 81     Past Medical History:    Cancer  GERD  Postoperative hernia     Past Surgical History:    Appendectomy  Esophagoscopy, gastroscopy, duodenoscopy (egd), combined    Hernia repair, BIH  Mohs micrographic procedure, right scalp, basal cell carcinoma     Family History:    Mother: Substance Abuse  Father: Kidney Cancer, Substance Abuse  Son: Depression     Social History:  Smoking Status: Never Smoker  Smokeless Tobacco: Never Used  Alcohol Use: Positive  Marital Status:   [2]     Review of Systems   Constitutional: Positive for diaphoresis and fever.   HENT: Positive for congestion, ear  "pain (Discomfort) and tinnitus.    Respiratory: Positive for cough.    Gastrointestinal: Negative for diarrhea.   Neurological: Negative for headaches.   All other systems reviewed and are negative.    Physical Exam   Vitals:  Patient Vitals for the past 24 hrs:   BP Temp Temp src Pulse Resp SpO2 Height Weight   08/01/17 0930 127/56 97.6  F (36.4  C) Oral 67 16 100 % 1.88 m (6' 2\") 73.5 kg (162 lb)      Physical Exam  Gen: Pleasant, appears stated age.    Eye:   Pupils are equal, round, and reactive.     Sclera non-injected.    ENT:   Moist mucus membranes.     Normal tongue.    Oropharynx without lesions.   Normal TM bilaterally.    Cardiac:     Normal rate and regular rhythm.    No murmurs, gallops, or rubs.   2+ radial pulses    Pulmonary:     Clear to auscultation bilaterally.    No wheezes, rales, or rhonchi.   No increased work of breathing.   Speaking in full sentences.    Abdomen:     Normal active bowel sounds.     Abdomen is soft and non-distended, without focal tenderness.    Musculoskeletal:     Normal movement of all extremities without evidence for deficit.    Extremities:    No edema.    Skin:   Warm and dry.    Neurologic:    Speech is fluent, cognition is normal.     EOMI, symmetric grimace.     Strength symmetric BUE and BLE.    Psychiatric:     Normal affect with appropriate interaction with examiner.      Emergency Department Course     Emergency Department Course:  Nursing notes and vitals reviewed.  I performed an exam of the patient as documented above.   I discussed the treatment plan with the patient. They expressed understanding of this plan and consented to discharge. They will be discharged home with instructions for care and follow up. In addition, the patient will return to the emergency department if their symptoms persist, worsen, if new symptoms arise or if there is any concern.  All questions were answered.  I personally reviewed the physical exam results with the patient and " answered all related questions prior to discharge.  Impression & Plan      Medical Decision Making:  Urbano Magallanes is a 66 year old male with a previous distant history of pneumonia who presents today complaining of productive cough. In addition, the patient complains of some right sided ear fullness and tinnitus ongoing for the last week. On exam, he has a normal temperature,and normal oxygen saturations. Lungs are clear bilaterally and he has no increased work of breathing. My suspicion for pneumonia is low. We discussed performing chest radiograph and the risk and benefits of that. At this point, the patient would like to defer any additional imaging. Overall, his philosophy of healthcare is towards fewer interventions. I think this is reasonable given that he appears well. In the meantime, I have recommended supportive cares with Tessalon pearls, Guaifenesin, and Prednisone. He will return to the emergency department or seek care from his primary care physician for difficulty breathing, worsening cough, fevers, chills, or other concerns. The patient also complains of a chronic headache ongoing for the last eight weeks. He had imaging including MRI of the brain about one week ago. This was negative for any evidence of hydrocephalus or intracranial mass. Otherwise, given that he is well appearing with otherwise normal exam, I do not think he needs any additional emergent work up. I have referred him to neurology as needed for additional evaluation. Otherwise he will return if worse.     Diagnosis:    ICD-10-CM    1. Cough R05    2. Tinnitus, right H93.11      Disposition:   The patient is discharged to home.    Discharge Medications:  New Prescriptions    BENZONATATE (TESSALON) 200 MG CAPSULE    Take 1 capsule (200 mg) by mouth 3 times daily as needed for cough    GUAIFENESIN (ORGANIDIN) 200 MG TABS TABLET    Take 2 tablets (400 mg) by mouth every 4 hours as needed for cough    PREDNISONE (DELTASONE) 20 MG TABLET     Take two tablets (= 40mg) each day for 5 (five) days     Scribe Disclosure:  I, Julio Bergman, am serving as a scribe at 10:06 AM on 8/1/2017 to document services personally performed by Ayesha Vick MD, based on my observations and the provider's statements to me.     EMERGENCY DEPARTMENT       Ayesha Vick MD  08/01/17 6150

## 2017-08-07 ENCOUNTER — OFFICE VISIT (OUTPATIENT)
Dept: FAMILY MEDICINE | Facility: CLINIC | Age: 67
End: 2017-08-07
Payer: COMMERCIAL

## 2017-08-07 VITALS
OXYGEN SATURATION: 97 % | SYSTOLIC BLOOD PRESSURE: 105 MMHG | HEIGHT: 74 IN | BODY MASS INDEX: 21.3 KG/M2 | DIASTOLIC BLOOD PRESSURE: 64 MMHG | HEART RATE: 73 BPM | WEIGHT: 166 LBS | TEMPERATURE: 97.4 F

## 2017-08-07 DIAGNOSIS — H69.91 ETD (EUSTACHIAN TUBE DYSFUNCTION), RIGHT: Primary | ICD-10-CM

## 2017-08-07 PROCEDURE — 99213 OFFICE O/P EST LOW 20 MIN: CPT | Performed by: NURSE PRACTITIONER

## 2017-08-07 RX ORDER — MINOCYCLINE HYDROCHLORIDE 100 MG/1
CAPSULE ORAL
COMMUNITY
Start: 2017-07-03 | End: 2018-07-26

## 2017-08-07 NOTE — PROGRESS NOTES
SUBJECTIVE:                                                    Urbano Magallanes is a 67 year old male who presents to clinic today for the following health issues:  Her today for ED follow up for cough and tinnitis  Feeling much better in terms of the cough but still having some ringing and fullness in right ear  He was taking mucinex, tessalon perles and prednison until today  Ear symptoms started when he had a cold and flew 3 weeks ago          Medical Decision Making:  Urbano Magallanes is a 66 year old male with a previous distant history of pneumonia who presents today complaining of productive cough. In addition, the patient complains of some right sided ear fullness and tinnitus ongoing for the last week. On exam, he has a normal temperature,and normal oxygen saturations. Lungs are clear bilaterally and he has no increased work of breathing. My suspicion for pneumonia is low. We discussed performing chest radiograph and the risk and benefits of that. At this point, the patient would like to defer any additional imaging. Overall, his philosophy of healthcare is towards fewer interventions. I think this is reasonable given that he appears well. In the meantime, I have recommended supportive cares with Tessalon pearls, Guaifenesin, and Prednisone. He will return to the emergency department or seek care from his primary care physician for difficulty breathing, worsening cough, fevers, chills, or other concerns. The patient also complains of a chronic headache ongoing for the last eight weeks. He had imaging including MRI of the brain about one week ago. This was negative for any evidence of hydrocephalus or intracranial mass. Otherwise, given that he is well appearing with otherwise normal exam, I do not think he needs any additional emergent work up. I have referred him to neurology as needed for additional evaluation. Otherwise he will return if worse.      Past Medical History:   Diagnosis Date     Cancer (H)      "skin basal     GERD (gastroesophageal reflux disease)      Postoperative hernia     post hernia syndrome      Current Outpatient Prescriptions   Medication     minocycline (MINOCIN/DYNACIN) 100 MG capsule     OMEPRAZOLE PO     VITAMIN D, CHOLECALCIFEROL, PO     Glucosamine HCl-MSM (MSM GLUCOSAMINE PO)     LOW-DOSE ASPIRIN PO     No current facility-administered medications for this visit.    10 point ROS of systems including Constitutional, Eyes, Respiratory, Cardiovascular, Gastroenterology, Genitourinary, Integumentary, Muscularskeletal, Psychiatric were all negative except for pertinent positives noted in my HPI.  OBJECTIVE: /64 (BP Location: Right arm, Patient Position: Sitting, Cuff Size: Adult Regular)  Pulse 73  Temp 97.4  F (36.3  C) (Oral)  Ht 6' 2\" (1.88 m)  Wt 166 lb (75.3 kg)  SpO2 97%  BMI 21.31 kg/m2  Exam:  Constitutional: healthy, alert and no distress  Head: Normocephalic. No masses, lesions, tenderness or abnormalities  Neck: Neck supple. No adenopathy. Thyroid symmetric, normal size,  ENT: ENT exam normal, no neck nodes or sinus tenderness  Cardiovascular: negative, PMI normal. RRR. No murmurs, clicks gallops or rub  Respiratory: negative, Percussion normal. Good diaphragmatic excursion. Lungs clear  Diag; none    ASSESSMENT:    ETD    Plan:    Patient Instructions   Begin claritin 10mg daily   and flonase 2 sprays each nostril once a day to try to clear the eustachian tubes   Earache, No Infection (Adult)  Earaches can happen without an infection. This occurs when air and fluid build up behind the eardrum causing a feeling of fullness and discomfort and reduced hearing. This is called otitis media with effusion (OME) or serous otitis media. It means there is fluid in the middle ear. It is not the same as acute otitis media, which is typically from infection.  OME can happen when you have a cold if congestion blocks the passage that drains the middle ear. This passage is called the " eustachian tube. OME may also occur with nasal allergies or after a bacterial middle ear infection.    The pain or discomfort may come and go. You may hear clicking or popping sounds when you chew or swallow. You may feel that your balance is off. Or you may hear ringing in the ear.  It often takes from several weeks up to 3 months for the fluid to clear on its own. Oral pain relievers and ear drops help if there is pain. Decongestants and antihistamines sometimes help. Antibiotics don't help since there is no infection. Your doctor may prescribe a nasal spray to help reduce swelling in the nose and eustachian tube. This can allow the ear to drain.  If your OME doesn't improve after 3 months, surgery may be used to drain the fluid and insert a small tube in the eardrum to allow continued drainage.  Because the middle ear fluid can become infected, it is important to watch for signs of an ear infection which may develop later. These signs include increased ear pain, fever, or drainage from the ear.  Home care  The following guidelines will help you care for yourself at home:    You may use over-the-counter medicine as directed to control pain, unless another medicine was prescribed. If you have chronic liver or kidney disease or ever had a stomach ulcer or GI bleeding, talk with your doctor before using these medicines. Aspirin should never be used in anyone under 18 years of age who is ill with a fever. It may cause severe liver damage.    You may use over-the-counter decongestants such as phenylephrine or pseudoephedrine. But they are not always helpful. Don't use nasal spray decongestants more than 3 days. Longer use can make congestion worse. Prescription nasal sprays from your doctor don't typically have those restrictions.    Antihistamines may help if you are also having allergy symptoms.    You may use medicines such as guaifenesin to thin mucus and promote drainage.  Follow-up care  Follow up with your  healthcare provider or as advised if you are not feeling better after 3 days.  When to seek medical advice  Call your healthcare provider right away if any of the following occur:    Your ear pain gets worse or does not start to improve     Fever of 100.4 F (38 C) or higher, or as directed by your healthcare provider    Fluid or blood draining from the ear    Headache or sinus pain    Stiff neck    Unusual drowsiness or confusion  Date Last Reviewed: 10/1/2016    6669-5619 [employer]. [address] [place] [address]. All rights reserved. This information is not intended as a substitute for professional medical care. Always follow your healthcare professional's instructions.

## 2017-08-07 NOTE — MR AVS SNAPSHOT
After Visit Summary   8/7/2017    Urbano Magallanes    MRN: [id number]           Patient Information     Date Of Birth          1950        Visit Information        Provider Department      8/7/2017 3:00 PM Elida Pelletier APRN [facility]        Care Instructions    Begin claritin 10mg daily   and flonase 2 sprays each nostril once a day to try to clear the eustachian tubes   Earache, No Infection (Adult)  Earaches can happen without an infection. This occurs when air and fluid build up behind the eardrum causing a feeling of fullness and discomfort and reduced hearing. This is called otitis media with effusion (OME) or serous otitis media. It means there is fluid in the middle ear. It is not the same as acute otitis media, which is typically from infection.  OME can happen when you have a cold if congestion blocks the passage that drains the middle ear. This passage is called the eustachian tube. OME may also occur with nasal allergies or after a bacterial middle ear infection.    The pain or discomfort may come and go. You may hear clicking or popping sounds when you chew or swallow. You may feel that your balance is off. Or you may hear ringing in the ear.  It often takes from several weeks up to 3 months for the fluid to clear on its own. Oral pain relievers and ear drops help if there is pain. Decongestants and antihistamines sometimes help. Antibiotics don't help since there is no infection. Your doctor may prescribe a nasal spray to help reduce swelling in the nose and eustachian tube. This can allow the ear to drain.  If your OME doesn't improve after 3 months, surgery may be used to drain the fluid and insert a small tube in the eardrum to allow continued drainage.  Because the middle ear fluid can become infected, it is important to watch for signs of an ear infection which may develop later. These signs include increased ear pain, fever, or drainage from the ear.  Home  care  The following guidelines will help you care for yourself at home:    You may use over-the-counter medicine as directed to control pain, unless another medicine was prescribed. If you have chronic liver or kidney disease or ever had a stomach ulcer or GI bleeding, talk with your doctor before using these medicines. Aspirin should never be used in anyone under 18 years of age who is ill with a fever. It may cause severe liver damage.    You may use over-the-counter decongestants such as phenylephrine or pseudoephedrine. But they are not always helpful. Don't use nasal spray decongestants more than 3 days. Longer use can make congestion worse. Prescription nasal sprays from your doctor don't typically have those restrictions.    Antihistamines may help if you are also having allergy symptoms.    You may use medicines such as guaifenesin to thin mucus and promote drainage.  Follow-up care  Follow up with your healthcare provider or as advised if you are not feeling better after 3 days.  When to seek medical advice  Call your healthcare provider right away if any of the following occur:    Your ear pain gets worse or does not start to improve     Fever of 100.4 F (38 C) or higher, or as directed by your healthcare provider    Fluid or blood draining from the ear    Headache or sinus pain    Stiff neck    Unusual drowsiness or confusion  Date Last Reviewed: 10/1/2016    8452-4035 [employer]. [address] [place] [address], [place] [id number]. All rights reserved. This information is not intended as a substitute for professional medical care. Always follow your healthcare professional's instructions.                Follow-ups after your visit        Who to contact     If you have questions or need follow up information about today's clinic visit or your schedule please contact [facility] directly at [phone].  Normal or non-critical lab and imaging results will be communicated to you by Carla  "letter or phone within 4 business days after the clinic has received the results. If you do not hear from us within 7 days, please contact the clinic through [facility] or phone. If you have a critical or abnormal lab result, we will notify you by phone as soon as possible.  Submit refill requests through [facility] [employer] or call your pharmacy and they will forward the refill request to us. Please allow 3 business days for your refill to be completed.          Additional Information About Your Visit        [employer] Information     [facility] lets you send messages to your doctor, view your test results, renew your prescriptions, schedule appointments and more. To sign up, go to [email or web address] . Click on \"Log in\" on the left side of the screen, which will take you to the Welcome page. Then click on \"Sign up Now\" on the right side of the page.     You will be asked to enter the access code listed below, as well as some personal information. Please follow the directions to create your username and password.     Your access code is: [email or web address]  Expires: 2017 10:27 AM     Your access code will  in 90 days. If you need help or a new code, please call your [place] clinic or [phone].        Care EveryWhere ID     This is your Care EveryWhere ID. This could be used by other organizations to access your [place] medical records  [id number]        Your Vitals Were     Pulse Temperature Height Pulse Oximetry BMI (Body Mass Index)       73 97.4  F (36.3  C) (Oral) 6' 2\" (1.88 m) 97% 21.31 kg/m2        Blood Pressure from Last 3 Encounters:   17 105/64   17 127/56   17 118/66    Weight from Last 3 Encounters:   17 166 lb (75.3 kg)   17 162 lb (73.5 kg)   17 167 lb 4.8 oz (75.9 kg)              Today, you had the following     No orders found for display       Primary Care Provider Office Phone # Fax #    Andrea Cruz [phone]       [facility] MACIE " [address]  [facility] [address]        Equal Access to Services     JOANN FLETCHER : Hadii aad ku haddesirekarlie Velasco, waflacoaldo mendosa, shabnamnikole yangсветланаaldo loera, efe huertasjohntatyana mathews. So [facility] [phone].    ATENCIÓN: Si habla español, tiene a lincoln disposición servicios gratuitos de asistencia lingüística. Llame al [phone].    We comply with applicable federal civil rights laws and [place] laws. We do not discriminate on the basis of race, color, national origin, age, disability sex, sexual orientation or gender identity.            Thank you!     Thank you for choosing [facility]  for your care. Our goal is always to provide you with excellent care. Hearing back from our patients is one way we can continue to improve our services. Please take a few minutes to complete the written survey that you may receive in the mail after your visit with us. Thank you!             Your Updated Medication List - Protect others around you: Learn how to safely use, store and throw away your medicines at www.disposemymeds.org.          This list is accurate as of: 8/7/17  3:11 PM.  Always use your most recent med list.                   Brand Name Dispense Instructions for use Diagnosis    LOW-DOSE ASPIRIN PO           minocycline 100 MG capsule    MINOCIN/DYNACIN     Take 1 pill every morning        MSM GLUCOSAMINE PO           OMEPRAZOLE PO           VITAMIN D (CHOLECALCIFEROL) PO      Take by mouth daily

## 2017-08-07 NOTE — NURSING NOTE
"Chief Complaint   Patient presents with     Hospital F/U     ED f/u       Initial /64 (BP Location: Right arm, Patient Position: Sitting, Cuff Size: Adult Regular)  Pulse 73  Temp 97.4  F (36.3  C) (Oral)  Ht 6' 2\" (1.88 m)  Wt 166 lb (75.3 kg)  SpO2 97%  BMI 21.31 kg/m2 Estimated body mass index is 21.31 kg/(m^2) as calculated from the following:    Height as of this encounter: 6' 2\" (1.88 m).    Weight as of this encounter: 166 lb (75.3 kg).  Medication Reconciliation: complete   Freya Loweing- CMA        "

## 2017-08-07 NOTE — PATIENT INSTRUCTIONS
Begin claritin 10mg daily   and flonase 2 sprays each nostril once a day to try to clear the eustachian tubes   Earache, No Infection (Adult)  Earaches can happen without an infection. This occurs when air and fluid build up behind the eardrum causing a feeling of fullness and discomfort and reduced hearing. This is called otitis media with effusion (OME) or serous otitis media. It means there is fluid in the middle ear. It is not the same as acute otitis media, which is typically from infection.  OME can happen when you have a cold if congestion blocks the passage that drains the middle ear. This passage is called the eustachian tube. OME may also occur with nasal allergies or after a bacterial middle ear infection.    The pain or discomfort may come and go. You may hear clicking or popping sounds when you chew or swallow. You may feel that your balance is off. Or you may hear ringing in the ear.  It often takes from several weeks up to 3 months for the fluid to clear on its own. Oral pain relievers and ear drops help if there is pain. Decongestants and antihistamines sometimes help. Antibiotics don't help since there is no infection. Your doctor may prescribe a nasal spray to help reduce swelling in the nose and eustachian tube. This can allow the ear to drain.  If your OME doesn't improve after 3 months, surgery may be used to drain the fluid and insert a small tube in the eardrum to allow continued drainage.  Because the middle ear fluid can become infected, it is important to watch for signs of an ear infection which may develop later. These signs include increased ear pain, fever, or drainage from the ear.  Home care  The following guidelines will help you care for yourself at home:    You may use over-the-counter medicine as directed to control pain, unless another medicine was prescribed. If you have chronic liver or kidney disease or ever had a stomach ulcer or GI bleeding, talk with your doctor before  using these medicines. Aspirin should never be used in anyone under 18 years of age who is ill with a fever. It may cause severe liver damage.    You may use over-the-counter decongestants such as phenylephrine or pseudoephedrine. But they are not always helpful. Don't use nasal spray decongestants more than 3 days. Longer use can make congestion worse. Prescription nasal sprays from your doctor don't typically have those restrictions.    Antihistamines may help if you are also having allergy symptoms.    You may use medicines such as guaifenesin to thin mucus and promote drainage.  Follow-up care  Follow up with your healthcare provider or as advised if you are not feeling better after 3 days.  When to seek medical advice  Call your healthcare provider right away if any of the following occur:    Your ear pain gets worse or does not start to improve     Fever of 100.4 F (38 C) or higher, or as directed by your healthcare provider    Fluid or blood draining from the ear    Headache or sinus pain    Stiff neck    Unusual drowsiness or confusion  Date Last Reviewed: 10/1/2016    7173-4652 [employer]. [address] [place] [address], [place] [address]. All rights reserved. This information is not intended as a substitute for professional medical care. Always follow your healthcare professional's instructions.

## 2017-08-21 ENCOUNTER — TRANSFERRED RECORDS (OUTPATIENT)
Dept: HEALTH INFORMATION MANAGEMENT | Facility: CLINIC | Age: 67
End: 2017-08-21

## 2017-12-04 ENCOUNTER — TRANSFERRED RECORDS (OUTPATIENT)
Dept: HEALTH INFORMATION MANAGEMENT | Facility: CLINIC | Age: 67
End: 2017-12-04

## 2018-05-18 ENCOUNTER — TELEPHONE (OUTPATIENT)
Dept: FAMILY MEDICINE | Facility: CLINIC | Age: 68
End: 2018-05-18

## 2018-05-18 DIAGNOSIS — H93.90 EAR PROBLEM, UNSPECIFIED LATERALITY: Primary | ICD-10-CM

## 2018-05-18 NOTE — TELEPHONE ENCOUNTER
Reason for Call: Request for an order or referral:    Order or referral being requested: Referral for an  ENT. Pt has an appointment with Dr. Kay on 7/5/18, but his office is requiring a referral from Dr. Suero  Referral may be faxed to: [phone].  He also needs a referral to an audiologist, he has an appointment with Dr. Bhagat on 7/5/18.    Date needed: before my next appointment    Has the patient been seen by the PCP for this problem? YES    Additional comments: Pt has spoken about this problem with dr suero in the past    Phone number Patient can be reached at:  Home number on file [phone] (home)    Best Time:  any    Can we leave a detailed message on this number?  YES    Call taken on 5/18/2018 at 12:49 PM by Marlena Hickman

## 2018-06-21 ENCOUNTER — OFFICE VISIT (OUTPATIENT)
Dept: FAMILY MEDICINE | Facility: CLINIC | Age: 68
End: 2018-06-21
Payer: COMMERCIAL

## 2018-06-21 VITALS
HEART RATE: 79 BPM | DIASTOLIC BLOOD PRESSURE: 70 MMHG | HEIGHT: 74 IN | TEMPERATURE: 97.2 F | SYSTOLIC BLOOD PRESSURE: 111 MMHG | OXYGEN SATURATION: 98 % | BODY MASS INDEX: 21.17 KG/M2 | WEIGHT: 165 LBS

## 2018-06-21 DIAGNOSIS — Z00.00 ROUTINE GENERAL MEDICAL EXAMINATION AT A HEALTH CARE FACILITY: Primary | ICD-10-CM

## 2018-06-21 DIAGNOSIS — K21.9 GASTROESOPHAGEAL REFLUX DISEASE WITHOUT ESOPHAGITIS: ICD-10-CM

## 2018-06-21 PROCEDURE — G0438 PPPS, INITIAL VISIT: HCPCS | Performed by: INTERNAL MEDICINE

## 2018-06-21 RX ORDER — MINOCYCLINE HYDROCHLORIDE 50 MG/1
50 CAPSULE ORAL
COMMUNITY
Start: 2018-04-19 | End: 2020-08-31

## 2018-06-21 NOTE — MR AVS SNAPSHOT
"              After Visit Summary   6/21/2018    Urbano Magallanes    MRN: [id number]           Patient Information     Date Of Birth          1950        Visit Information        Provider Department      6/21/2018 9:30 AM Andrea Cruz MD [facility]        Today's Diagnoses     Routine general medical examination at a health care facility    -  1    Gastroesophageal reflux disease without esophagitis          Care Instructions    You should get the new shingles vaccine \"SHINGRIX\" (not Zostavax) at your pharmacy.            Preventive Health Recommendations:       Male Ages 65 and over    Yearly exam:             See your health care provider every year in order to  o   Review health changes.   o   Discuss preventive care.    o   Review your medicines if your doctor has prescribed any.    Talk with your health care provider about whether you should have a test to screen for prostate cancer (PSA).    Every 3 years, have a diabetes test (fasting glucose). If you are at risk for diabetes, you should have this test more often.    Every 5 years, have a cholesterol test. Have this test more often if you are at risk for high cholesterol or heart disease.     Every 10 years, have a colonoscopy. Or, have a yearly FIT test (stool test). These exams will check for colon cancer.    Talk to with your health care provider about screening for Abdominal Aortic Aneurysm if you have a family history of AAA or have a history of smoking.  Shots:     Get a flu shot each year.     Get a tetanus shot every 10 years.     Talk to your doctor about your pneumonia vaccines. There are now two you should receive - Pneumovax (PPSV 23) and Prevnar (PCV 13).    Talk to your doctor about a shingles vaccine.     Talk to your doctor about the hepatitis B vaccine.  Nutrition:     Eat at least 5 servings of fruits and vegetables each day.     Eat whole-grain bread, whole-wheat pasta and brown rice instead of white grains and rice. "     Talk to your doctor about Calcium and Vitamin D.   Lifestyle    Exercise for at least 150 minutes a week (30 minutes a day, 5 days a week). This will help you control your weight and prevent disease.     Limit alcohol to one drink per day.     No smoking.     Wear sunscreen to prevent skin cancer.     See your dentist every six months for an exam and cleaning.     See your eye doctor every 1 to 2 years to screen for conditions such as glaucoma, macular degeneration and cataracts.          Follow-ups after your visit        Follow-up notes from your care team     Return in about 1 year (around 6/21/2019) for Physical Exam.      Your next 10 appointments already scheduled     Jul 05, 2018  8:00 AM CDT   Walk In From ENT with Dwain Patel   [facility] ([facility])    [address] [place] [address] [id number] [phone]            Jul 05, 2018  9:30 AM CDT   (Arrive by 9:15 AM)   New Patient Visit with Zac Kay MD   [facility] ([facility])    [address] [place] [id number] [phone]              Who to contact     If you have questions or need follow up information about today's clinic visit or your schedule please contact [facility] directly at [phone].  Normal or non-critical lab and imaging results will be communicated to you by MyChart, letter or phone within 4 business days after the clinic has received the results. If you do not hear from us within 7 days, please contact the clinic through MyChart or phone. If you have a critical or abnormal lab result, we will notify you by phone as soon as possible.  Submit refill requests through [employer] or call your pharmacy and they will forward the refill request to us. Please allow 3 business days for your refill to be completed.          Additional Information About Your Visit        [employer]  "Information     [employer] lets you send messages to your doctor, view your test results, renew your prescriptions, schedule appointments and more. To sign up, go to [email or web address] . Click on \"Log in\" on the left side of the screen, which will take you to the Welcome page. Then click on \"Sign up Now\" on the right side of the page.     You will be asked to enter the access code listed below, as well as some personal information. Please follow the directions to create your username and password.     Your access code is: [email or web address]  Expires: 2018  6:31 AM     Your access code will  in 90 days. If you need help or a new code, please call your [place] clinic or [phone].        Care EveryWhere ID     This is your [facility] EveryWhere ID. This could be used by other organizations to access your [place] medical records  [id number]        Your Vitals Were     Pulse Temperature Height Pulse Oximetry BMI (Body Mass Index)       79 97.2  F (36.2  C) (Oral) 6' 2\" (1.88 m) 98% 21.18 kg/m2        Blood Pressure from Last 3 Encounters:   18 111/70   17 105/64   17 127/56    Weight from Last 3 Encounters:   18 165 lb (74.8 kg)   17 166 lb (75.3 kg)   17 162 lb (73.5 kg)              Today, you had the following     No orders found for display         Today's Medication Changes          These changes are accurate as of 18 10:07 AM.  If you have any questions, ask your nurse or doctor.               These medicines have changed or have updated prescriptions.        Dose/Directions    * OMEPRAZOLE PO   This may have changed:  Another medication with the same name was added. Make sure you understand how and when to take each.   Changed by:  Andrea Cruz MD        Refills:  0       * omeprazole 20 MG CR capsule   Commonly known as:  priLOSEC   This may have changed:  You were already taking a medication with the same name, and this prescription was added. Make sure you " understand how and when to take each.   Used for:  Gastroesophageal reflux disease without esophagitis   Changed by:  Andrea Cruz MD        Dose:  20 mg   Take 1 capsule (20 mg) by mouth daily   Quantity:  90 capsule   Refills:  3       * Notice:  This list has 2 medication(s) that are the same as other medications prescribed for you. Read the directions carefully, and ask your doctor or other care provider to review them with you.         Where to get your medicines      These medications were sent to [id number] - [facility] [address] [facility]  [address] [facility] [address]     Phone:  [phone]     omeprazole 20 MG CR capsule                Primary Care Provider Office Phone # Fax #    Andrea Cruz [phone] [address] [address]        Equal Access to Services     [facility]: Hadii shmuel ku hadasho Soceline, waaxda luqadaha, qaybta kaalmada adeegyada, efe pascual hayrey chavez . So [facility] [phone].    ATENCIÓN: Si habla español, tiene a lincoln disposición servicios gratuitos de asistencia lingüística. Llame al [phone].    We comply with applicable federal civil rights laws and [place] laws. We do not discriminate on the basis of race, color, national origin, age, disability, sex, sexual orientation, or gender identity.            Thank you!     Thank you for choosing [facility]  for your care. Our goal is always to provide you with excellent care. Hearing back from our patients is one way we can continue to improve our services. Please take a few minutes to complete the written survey that you may receive in the mail after your visit with us. Thank you!             Your Updated Medication List - Protect others around you: Learn how to safely use, store and throw away your medicines at www.disposemymeds.org.          This list is accurate as of 6/21/18 10:07 AM.  Always use your most recent med  list.                   Brand Name Dispense Instructions for use Diagnosis    LOW-DOSE ASPIRIN PO           * minocycline 100 MG capsule    MINOCIN/DYNACIN     Take 1 pill every morning        * minocycline 50 MG capsule    MINOCIN/DYNACIN     Take 50 mg by mouth        MSM GLUCOSAMINE PO           * OMEPRAZOLE PO           * omeprazole 20 MG CR capsule    priLOSEC    90 capsule    Take 1 capsule (20 mg) by mouth daily    Gastroesophageal reflux disease without esophagitis       VITAMIN D (CHOLECALCIFEROL) PO      Take by mouth daily        * Notice:  This list has 4 medication(s) that are the same as other medications prescribed for you. Read the directions carefully, and ask your doctor or other care provider to review them with you.

## 2018-06-21 NOTE — PROGRESS NOTES
SUBJECTIVE:   Urbano Magallanes is a 67 year old male who presents for Preventive Visit.    Are you in the first 12 months of your Medicare Part B coverage?  No    Healthy Habits:    Do you get at least three servings of calcium containing foods daily (dairy, green leafy vegetables, etc.)? yes    Amount of exercise or daily activities, outside of work: 4-5 day(s) per week    Problems taking medications regularly No    Medication side effects: No    Have you had an eye exam in the past two years? yes    Do you see a dentist twice per year? yes    Do you have sleep apnea, excessive snoring or daytime drowsiness?no      Ability to successfully perform activities of daily living: Yes, no assistance needed    Home safety:  none identified     Hearing impairment: Yes, Tinnitus; seeing specialist in July    Fall risk:  Fallen 2 or more times in the past year?: No  Any fall with injury in the past year?: No      COGNITIVE SCREEN  1) Repeat 3 items (Banana, Sunrise, Chair)    2) Clock draw: NORMAL  3) 3 item recall: Recalls 3 objects  Results: 3 items recalled: COGNITIVE IMPAIRMENT LESS LIKELY    Mini-CogTM Copyright S Ning. Licensed by the author for use in [facility]; reprinted with permission (liz@[facility]). All rights reserved.      Labs brought in from clinic in [place], [place]    Reviewed and updated as needed this visit by clinical staff  Tobacco  Allergies  Meds  Med Hx  Surg Hx  Fam Hx  Soc Hx        Reviewed and updated as needed this visit by Provider  Tobacco  Med Hx  Surg Hx  Fam Hx  Soc Hx       Social History   Substance Use Topics     Smoking status: Never Smoker     Smokeless tobacco: Never Used     Alcohol use 0.0 oz/week     0 Standard drinks or equivalent per week      Comment: 1/week       If you drink alcohol do you typically have >3 drinks per day or >7 drinks per week? No                        Today's PHQ-2 Score:   PHQ-2 ( 1999 [employer]) 6/21/2018 6/15/2017   Q1: Little interest  or pleasure in doing things 0 0   Q2: Feeling down, depressed or hopeless 0 1   PHQ-2 Score 0 1       Do you feel safe in your environment - Yes    Do you have a Health Care Directive?: Yes: Patient states has Advance Directive and will bring in a copy to clinic.    Current providers sharing in care for this patient include:   Patient Care Team:  Andrea Cruz MD as PCP - General (Internal Medicine)    The following health maintenance items are reviewed in Epic and correct as of today:  Health Maintenance   Topic Date Due     HEPATITIS C SCREENING  08/05/1966     COLON CANCER SCREEN (SYSTEM ASSIGNED)  08/05/1998     ADVANCE DIRECTIVE PLANNING Q5 YRS  08/05/2003     AORTIC ANEURYSM SCREENING (SYSTEM ASSIGNED)  08/05/2013     FALL RISK ASSESSMENT  08/07/2018     INFLUENZA VACCINE (Season Ended) 09/01/2018     LIPID SCREEN Q5 YR MALE (SYSTEM ASSIGNED)  02/24/2022     TETANUS IMMUNIZATION (SYSTEM ASSIGNED)  07/03/2022     PNEUMOCOCCAL  Completed     Patient Active Problem List   Diagnosis     Right inguinal pain     Gastroesophageal reflux disease without esophagitis     Actinic keratosis     Backache     Depressive disorder     Elevated PSA     Hearing loss     History of basal cell carcinoma     History of disease of skin and subcutaneous tissue     History of dysplastic nevus     Past Surgical History:   Procedure Laterality Date     APPENDECTOMY       ESOPHAGOSCOPY, GASTROSCOPY, DUODENOSCOPY (EGD), COMBINED N/A 6/6/2017    Procedure: COMBINED ESOPHAGOSCOPY, GASTROSCOPY, DUODENOSCOPY (EGD), BIOPSY SINGLE OR MULTIPLE;  gastroscopy;  Surgeon: Jefferson Palmer MD;  Location: [facility]     HERNIA REPAIR  1982, 2003, 2015    [facility]      MOHS MICROGRAPHIC PROCEDURE      right scalp; basal cell carcinoma       Social History   Substance Use Topics     Smoking status: Never Smoker     Smokeless tobacco: Never Used     Alcohol use 0.0 oz/week     0 Standard drinks or equivalent per week      Comment: 1/week     Family History  "  Problem Relation Age of Onset     Substance Abuse Mother      Other Cancer Father      kidney cancer     Substance Abuse Father      Depression Son          Current Outpatient Prescriptions   Medication Sig Dispense Refill     Glucosamine HCl-MSM (MSM GLUCOSAMINE PO)        minocycline (MINOCIN/DYNACIN) 50 MG capsule Take 50 mg by mouth       OMEPRAZOLE PO        VITAMIN D, CHOLECALCIFEROL, PO Take by mouth daily       LOW-DOSE ASPIRIN PO        minocycline (MINOCIN/DYNACIN) 100 MG capsule Take 1 pill every morning       Allergies   Allergen Reactions     No Known Allergies            ROS:  Constitutional, HEENT, cardiovascular, pulmonary, gi and gu systems are negative, except as otherwise noted.    OBJECTIVE:   /70 (BP Location: Left arm, Patient Position: Sitting, Cuff Size: Adult Regular)  Pulse 79  Temp 97.2  F (36.2  C) (Oral)  Ht 6' 2\" (1.88 m)  Wt 165 lb (74.8 kg)  SpO2 98%  BMI 21.18 kg/m2 Estimated body mass index is 21.18 kg/(m^2) as calculated from the following:    Height as of this encounter: 6' 2\" (1.88 m).    Weight as of this encounter: 165 lb (74.8 kg).  EXAM:   GENERAL: healthy, alert and no distress  EYES: Eyes grossly normal to inspection, PERRL and conjunctivae and sclerae normal  HENT: ear canals and TM's normal, nose and mouth without ulcers or lesions  NECK: no adenopathy, no asymmetry, masses, or scars and thyroid normal to palpation  RESP: lungs clear to auscultation - no rales, rhonchi or wheezes  CV: regular rate and rhythm, normal S1 S2, no S3 or S4, no murmur, click or rub, no peripheral edema and peripheral pulses strong  ABDOMEN: soft, nontender, no hepatosplenomegaly, no masses and bowel sounds normal  RECTAL: normal sphincter tone, no rectal masses, prostate normal size, smooth, nontender without nodules or masses  MS: no gross musculoskeletal defects noted, no edema  SKIN: no suspicious lesions or rashes  NEURO: Normal strength and tone, mentation intact and speech " "normal  PSYCH: mentation appears normal, affect normal/bright    ASSESSMENT / PLAN:   1. Routine general medical examination at a health care facility      2. Gastroesophageal reflux disease without esophagitis  Symptomatic benefits of PPI outweigh risks, B12 was [facility] at [facility] last December   - omeprazole (PRILOSEC) 20 MG CR capsule; Take 1 capsule (20 mg) by mouth daily  Dispense: 90 capsule; Refill: 3    End of Life Planning:  Patient currently has an advanced directive: Yes.  Practitioner is supportive of decision.    COUNSELING:  Reviewed preventive health counseling, as reflected in patient instructions  Special attention given to:       Consider AAA screening for ages 65-75 and smoking history; patient is a never smoker       Regular exercise       Healthy diet/nutrition       Immunizations    Recommended shingrix              Colon cancer screening (remindned patient about screening; he believes he had a normal colonoscopy 2014 he cannot quite remember where, I checked [facility] every record showing last colonoscopy in 2006; he believes he does not need follow up screening until 2024)       Pro[facility] cancer screening [facility] [place]        Estimated body mass index is 21.18 kg/(m^2) as calculated from the following:    Height as of this encounter: 6' 2\" (1.88 m).    Weight as of this encounter: 165 lb (74.8 kg).       reports that he has never smoked. He has never used smokeless tobacco.      Appropriate preventive services were discussed with this patient, including applicable screening as appropriate for cardiovascular disease, diabetes, osteopenia/osteoporosis, and glaucoma.  As appropriate for age/gender, discussed screening for colorectal cancer, prostate cancer, breast cancer, and cervical cancer. Checklist reviewing preventive services available has been given to the patient.    Reviewed patients plan of care and provided an AVS. The Basic Care Plan (routine " screening as documented in Health Maintenance) for Urbano meets the Care Plan requirement. This Care Plan has been established and reviewed with the Patient.    Counseling Resources:  ATP IV Guidelines  Pooled Cohorts Equation Calculator  Breast Cancer Risk Calculator  FRAX Risk Assessment  ICSI Preventive Guidelines  Dietary Guidelines for Americans, 2010  USDA's MyPlate  ASA Prophylaxis  Lung CA Screening    Andrea Cruz MD  [facility]

## 2018-06-21 NOTE — PATIENT INSTRUCTIONS
"You should get the new shingles vaccine \"SHINGRIX\" (not Zostavax) at your pharmacy.            Preventive Health Recommendations:       Male Ages 65 and over    Yearly exam:             See your health care provider every year in order to  o   Review health changes.   o   Discuss preventive care.    o   Review your medicines if your doctor has prescribed any.    Talk with your health care provider about whether you should have a test to screen for prostate cancer (PSA).    Every 3 years, have a diabetes test (fasting glucose). If you are at risk for diabetes, you should have this test more often.    Every 5 years, have a cholesterol test. Have this test more often if you are at risk for high cholesterol or heart disease.     Every 10 years, have a colonoscopy. Or, have a yearly FIT test (stool test). These exams will check for colon cancer.    Talk to with your health care provider about screening for Abdominal Aortic Aneurysm if you have a family history of AAA or have a history of smoking.  Shots:     Get a flu shot each year.     Get a tetanus shot every 10 years.     Talk to your doctor about your pneumonia vaccines. There are now two you should receive - Pneumovax (PPSV 23) and Prevnar (PCV 13).    Talk to your doctor about a shingles vaccine.     Talk to your doctor about the hepatitis B vaccine.  Nutrition:     Eat at least 5 servings of fruits and vegetables each day.     Eat whole-grain bread, whole-wheat pasta and brown rice instead of white grains and rice.     Talk to your doctor about Calcium and Vitamin D.   Lifestyle    Exercise for at least 150 minutes a week (30 minutes a day, 5 days a week). This will help you control your weight and prevent disease.     Limit alcohol to one drink per day.     No smoking.     Wear sunscreen to prevent skin cancer.     See your dentist every six months for an exam and cleaning.     See your eye doctor every 1 to 2 years to screen for conditions such as glaucoma, " macular degeneration and cataracts.

## 2018-06-22 NOTE — TELEPHONE ENCOUNTER
FUTURE VISIT INFORMATION      FUTURE VISIT INFORMATION:    Date: 07/05/2018    Time: 9:30    Location: [facility]  REFERRAL INFORMATION:    Referring provider:  SELF    Referring providers clinic:  N/A    Reason for visit/diagnosis  TINNITUS    RECORDS REQUESTED FROM:       Clinic name Comments Records Status Imaging Status   [place] 05/18/2018 TELEPHONE ENCOUNTER INTERNAL NONE   [place] ED OFFICE VISIT: 08/01/2017  IMAGE: MRI BRAIN 07/26/2017 INTERNAL YES                             RECORDS STATUS

## 2018-06-28 DIAGNOSIS — H93.13 TINNITUS, BILATERAL: Primary | ICD-10-CM

## 2018-07-05 ENCOUNTER — OFFICE VISIT (OUTPATIENT)
Dept: AUDIOLOGY | Facility: CLINIC | Age: 68
End: 2018-07-05
Payer: COMMERCIAL

## 2018-07-05 ENCOUNTER — OFFICE VISIT (OUTPATIENT)
Dept: OTOLARYNGOLOGY | Facility: CLINIC | Age: 68
End: 2018-07-05
Payer: COMMERCIAL

## 2018-07-05 ENCOUNTER — PRE VISIT (OUTPATIENT)
Dept: OTOLARYNGOLOGY | Facility: CLINIC | Age: 68
End: 2018-07-05

## 2018-07-05 VITALS — BODY MASS INDEX: 21.17 KG/M2 | WEIGHT: 165 LBS | HEIGHT: 74 IN

## 2018-07-05 DIAGNOSIS — H93.13 TINNITUS OF BOTH EARS: ICD-10-CM

## 2018-07-05 DIAGNOSIS — H90.3 SNHL (SENSORY-NEURAL HEARING LOSS), ASYMMETRICAL: Primary | ICD-10-CM

## 2018-07-05 DIAGNOSIS — H90.3 SENSORINEURAL HEARING LOSS, ASYMMETRICAL: Primary | ICD-10-CM

## 2018-07-05 PROBLEM — Z85.828 HISTORY OF SQUAMOUS CELL CARCINOMA OF SKIN: Status: ACTIVE | Noted: 2017-07-03

## 2018-07-05 ASSESSMENT — PAIN SCALES - GENERAL: PAINLEVEL: NO PAIN (0)

## 2018-07-05 NOTE — NURSING NOTE
"Chief Complaint   Patient presents with     Consult     tinnitus      Height 1.88 m (6' 2.02\"), weight 74.8 kg (165 lb).    Misael Vazquez LPN    "

## 2018-07-05 NOTE — PROGRESS NOTES
"The patient presents with a history of hearing loss and tinnitus.  The patient reports a progressive hearing loss in both ears over the past few years.  The patient denies ear infections, otalgia, otorrhea, or dizziness, but he reports tinnitus and sometimes fullness in the ears, particularly on the right.  The patient denies sinusitis, rhinitis, facial pain, nasal obstruction or purulent nasal discharge. The patient denies chronic or recurrent tonsillitis, chronic or recurrent pharyngitis. His Audiogram and Tympanogram are reviewed with him and they demonstrate bilateral .snl that is mild to severe and worse in the right ear than in the left ear with normal tympanograms and 84% word recognition on the right and 92% word recognition on the left.     This patient is seen in consultation as a self referral to my clinic.     All other systems were reviewed and they are either negative or they are not directly pertinent to this Otolaryngology examination.      Past Medical History:    Past Medical History:   Diagnosis Date     Cancer (H)     skin basal     GERD (gastroesophageal reflux disease)      Hearing problem 1971    Tinnitus     Postoperative hernia     post hernia syndrome      Tinnitus 1971     service  But \"rampted up\" a couple of months ago       Past Surgical History:    Past Surgical History:   Procedure Laterality Date     APPENDECTOMY       ESOPHAGOSCOPY, GASTROSCOPY, DUODENOSCOPY (EGD), COMBINED N/A 6/6/2017    Procedure: COMBINED ESOPHAGOSCOPY, GASTROSCOPY, DUODENOSCOPY (EGD), BIOPSY SINGLE OR MULTIPLE;  gastroscopy;  Surgeon: Jefferson Palmer MD;  Location:  GI     [facility]ERNIA REPAIR  1982, 2003, 2015    [facility]      MOHS MICROGRAPHIC PROCEDURE      right scalp; basal cell carcinoma       Medications:      Current Outpatient Prescriptions:      Glucosamine HCl-MSM (MSM GLUCOSAMINE PO), , Disp: , Rfl:      LOW-DOSE ASPIRIN PO, , Disp: , Rfl:      minocycline (MINOCIN/DYNACIN) 50 MG capsule, Take 50 mg " by mouth, Disp: , Rfl:      omeprazole (PRILOSEC) 20 MG CR capsule, Take 1 capsule (20 mg) by mouth daily, Disp: 90 capsule, Rfl: 3     OMEPRAZOLE PO, , Disp: , Rfl:      VITAMIN D, CHOLECALCIFEROL, PO, Take by mouth daily, Disp: , Rfl:      minocycline (MINOCIN/DYNACIN) 100 MG capsule, Take 1 pill every morning, Disp: , Rfl:     Allergies:    No known allergies    Physical Examination:    The patient is a well developed, well nourished male in no apparent distress.  He is normocephalic, atraumatic with pupils equally round and reactive to light.    Oral Cavity Examination:  Normal mucosa with no masses or lesions  Nasal Examination: Normal mucosa with no masses or lesions  Ear Examination: Ear canals clear, tympanic membranes and middle ear spaces normal  Neurological Examination: Facial nerve function intact and symmetric  Integumentary Examination: No lesions on the skin of the head and neck  Neck Examination: No masses or lesions, no lymphadenopathy  Endocrine Examination: Normal thyroid examination    Assessment and Plan:    The patient presents with a history of asymmetric sensorineural hearing loss and tinnitus. He will proceed with the use of his hearing aids as recommended by the Audiologist and he will try to change to alternative medications from his Minocycline. He will be referred for an MRI scan of the head to assess for retrocochlear pathology or intracranial pathology. He will be seen again after this testing is completed.

## 2018-07-05 NOTE — MR AVS SNAPSHOT
After Visit Summary   7/5/2018    Urbano Magallanes    MRN: [id number]           Patient Information     Date Of Birth          1950        Visit Information        Provider Department      7/5/2018 8:00 AM Asia Floyd AuD [facility]        Today's Diagnoses     Sensorineural hearing loss, asymmetrical    -  1    Tinnitus of both ears           Follow-ups after your visit        Your next 10 appointments already scheduled     Jul 05, 2018  9:30 AM CDT   (Arrive by 9:15 AM)   New Patient Visit with Zac Kay MD   [facility] ([facility] [place])    [address]  [address] [place] [place] [facility] [address] [phone]              Who to contact     Please call your clinic at [phone] to:    Ask questions about your health    Make or cancel appointments    Discuss your medicines    Learn about your test results    Speak to your doctor            Additional Information About Your Visit        MyChart Information     The [employer] gives you secure access to your electronic health record. If you see a primary care provider, you can also send messages to your care team and make appointments. If you have questions, please call your primary care clinic.  If you do not have a primary care provider, please call [phone] and they will assist you.      [employer] is an electronic gateway that provides easy, online access to your medical records. With [employer], you can request a clinic appointment, read your test results, renew a prescription or communicate with your care team.     To access your existing account, please contact your [facility] or call [phone] for assistance.        Care EveryWhere ID     This is your Care EveryWhere ID. This could be used by other organizations to access your [place] medical records  [id number]         Blood Pressure from Last 3 Encounters:   06/21/18 111/70   08/07/17  105/64   08/01/17 127/56    Weight from Last 3 Encounters:   06/21/18 74.8 kg (165 lb)   08/07/17 75.3 kg (166 lb)   08/01/17 73.5 kg (162 lb)              We Performed the Following     AUDIOGRAM/TYMPANOGRAM - INTERFACE     [facility] Audiometry Thr[facility]ld Eval & Speech Recog ([id number])     Tymps / Reflex   ([id number])        Primary Care Provider Office Phone # Fax #    Andrea Cruz [phone] [address]  [place] [address]        Equal Access to Services     [facility]: Hadii aad ku hadasho Soomaali, waaxda luqadaha, qaybta kaalmada adeegyada, waxtj nicholsin hayaan adeeg jacki chavez . So [facility] [phone].    ATENCIÓN: Si habla español, tiene a lincoln disposición servicios gratuitos de asistencia lingüística. [facility] [phone].    We comply with applicable federal civil rights laws and [place] laws. We do not discriminate on the basis of race, color, national origin, age, disability, sex, sexual orientation, or gender identity.            Thank you!     Thank you for choosing [facility]  for your care. Our goal is always to provide you with excellent care. Hearing back from our patients is one way we can continue to improve our services. Please take a few minutes to complete the written survey that you may receive in the mail after your visit with us. Thank you!             Your Updated Medication List - Protect others around you: Learn how to safely use, store and throw away your medicines at www.disposemymeds.org.          This list is accurate as of 7/5/18  8:38 AM.  Always use your most recent med list.                   Brand Name Dispense Instructions for use Diagnosis    LOW-DOSE ASPIRIN PO           * minocycline 100 MG capsule    MINOCIN/DYNACIN     Take 1 pill every morning        * minocycline 50 MG capsule    MINOCIN/DYNACIN     Take 50 mg by mouth        MSM GLUCOSAMINE PO           * OMEPRAZOLE PO           * omeprazole 20 MG CR capsule    priLOSEC    90 capsule     Take 1 capsule (20 mg) by mouth daily    Gastroesophageal reflux disease without esophagitis       VITAMIN D (CHOLECALCIFEROL) PO      Take by mouth daily        * Notice:  This list has 4 medication(s) that are the same as other medications prescribed for you. Read the directions carefully, and ask your doctor or other care provider to review them with you.

## 2018-07-05 NOTE — MR AVS SNAPSHOT
After Visit Summary   7/5/2018    Urbano Magallanes    MRN: [id number]           Patient Information     Date Of Birth          1950        Visit Information        Provider Department      7/5/2018 9:30 AM Zac Kay MD [facility] Ear Nose and Throat        Today's Diagnoses     SNHL (sensory-neural hearing loss), asymmetrical    -  1      Care Instructions    The patient presents with a history of asymmetric sensorineural hearing loss and tinnitus. He will proceed with the use of his hearing aids as recommended by the Audiologist and he will try to change to alternative medications from his Minocycline. He will be referred for an MRI scan of the head to assess for retrocochlear pathology or intracranial pathology. He will be seen again after this testing is completed.           Follow-ups after your visit        Your next 10 appointments already scheduled     Jul 05, 2018  9:30 AM CDT   (Arrive by 9:15 AM)   New Patient Visit with Zac Kay MD   [facility] Ear Nose and Throat ([facility] [place])    [address]  4th Floor  [facility] [id number] [phone]            Jul 21, 2018  1:00 PM CDT   MR BRAIN W/O & W CONTRAST with [address] MRI ([facility])    [address]  1st Floor  [facility] [id number] [phone]           Take your medicines as usual, unless your doctor tells you not to. Bring a list of your current medicines to your exam (including vitamins, minerals and over-the-counter drugs).  You may or may not receive intravenous (IV) contrast for this exam pending the discretion of the Radiologist.  You do not need to do anything special to prepare.  The MRI machine uses a strong magnet. Please wear clothes without metal (snaps, zippers). A sweatsuit works well, or we may give you a hospital gown.  Please remove any body piercings and hair extensions before you arrive. You  will also remove watches, jewelry, hairpins, wallets, dentures, partial dental plates and hearing aids. You may wear contact lenses, and you may be able to wear your rings. We have a safe place to keep your personal items, but it is safer to leave them at home.  **IMPORTANT** THE INSTRUCTIONS BELOW ARE ONLY FOR THOSE PATIENTS WHO HAVE BEEN PRESCRIBED SEDATION OR GENERAL ANESTHESIA DURING THEIR MRI PROCEDURE:  IF YOUR DOCTOR PRESCRIBED ORAL SEDATION (take medicine to help you relax during your exam):   You must get the medicine from your doctor (oral medication) before you arrive. Bring the medicine to the exam. Do not take it at home. You ll be told when to take it upon arriving for your exam.   Arrive one hour early. Bring someone who can take you home after the test. Your medicine will make you sleepy. After the exam, you may not drive, take a bus or take a taxi by yourself.  IF YOUR DOCTOR PRESCRIBED IV SEDATION:   Arrive one hour early. Bring someone who can take you home after the test. Your medicine will make you sleepy. After the exam, you may not drive, take a bus or take a taxi by yourself.   No eating 6 hours before your exam. You may have clear liquids up until 4 hours before your exam. (Clear liquids include water, clear tea, black coffee and fruit juice without pulp.)  IF YOUR DOCTOR PRESCRIBED ANESTHESIA (be asleep for your exam):   Arrive 1 1/2 hours early. Bring someone who can take you home after the test. You may not drive, take a bus or take a taxi by yourself.   No eating 8 hours before your exam. You may have clear liquids up until 4 hours before your exam. (Clear liquids include water, clear tea, black coffee and fruit juice without pulp.)   You will spend four to five hours in the recovery room.  Please call the Imaging Department at your exam site with any questions.            Jul 26, 2018  7:45 AM CDT   (Arrive by 7:30 AM)   Return Visit with Zac Kay MD   [facility] Ear Nose  "and Throat ([facility] [place])    [address]  4th [facility] [address] [phone]              Future tests that were ordered for you today     Open Future Orders        Priority Expected Expires Ordered    Urea nitrogen Routine  7/5/2019 7/5/2018    Creatinine Routine  7/5/2019 7/5/2018    MRI Brain and Skull Base w & w/o contrast Routine  7/5/2019 7/5/2018            Who to contact     Please call your clinic at [phone] to:    Ask questions about your health    Make or cancel appointments    Discuss your medicines    Learn about your test results    Speak to your doctor            Additional Information About Your Visit        [employer] Information     [employer] gives you secure access to your electronic health record. If you see a primary care provider, you can also send messages to your care team and make appointments. If you have questions, please call your primary care clinic.  If you do not have a primary care provider, please call [phone] and they will assist you.      [employer] is an electronic gateway that provides easy, online access to your medical records. With [employer], you can request a clinic appointment, read your test results, renew a prescription or communicate with your care team.     To access your existing account, please contact your [facility] or call [phone] for assistance.        Care EveryWhere ID     This is your Care EveryWhere ID. This could be used by other organizations to access your [facility] medical records  [id number]        Your Vitals Were     Height BMI (Body Mass Index)                1.88 m (6' 2.02\") 21.18 kg/m2           Blood Pressure from Last 3 Encounters:   06/21/18 111/70   08/07/17 105/64   08/01/17 127/56    Weight from Last 3 Encounters:   07/05/18 74.8 kg (165 lb)   06/21/18 74.8 kg (165 lb)   08/07/17 75.3 kg (166 lb)               Primary Care Provider Office Phone # Fax #    " Andrea Cruz [phone]       [address]  [facility] [address]        Equal Access to Services     SUDHAKASIE JUSTINE : Donn shmuel joshi xiomara Velasco, benjie jacesharri, shlomo kaсветланаda evaristo, efe pinzonalbin reid. So [facility] [phone].    ATENCIÓN: Si habla español, tiene a lincoln disposición servicios gratuitos de asistencia lingüística. Llame al [phone].    We comply with applicable federal civil rights laws and [place] laws. We do not discriminate on the basis of race, color, national origin, age, disability, sex, sexual orientation, or gender identity.            Thank you!     Thank you for choosing [facility] EAR NOSE AND THROAT  for your care. Our goal is always to provide you with excellent care. Hearing back from our patients is one way we can continue to improve our services. Please take a few minutes to complete the written survey that you may receive in the mail after your visit with us. Thank you!             Your Updated Medication List - Protect others around you: Learn how to safely use, store and throw away your medicines at www.disposemymeds.org.          This list is accurate as of 7/5/18  9:21 AM.  Always use your most recent med list.                   Brand Name Dispense Instructions for use Diagnosis    LOW-DOSE ASPIRIN PO           * minocycline 100 MG capsule    MINOCIN/DYNACIN     Take 1 pill every morning        * minocycline 50 MG capsule    MINOCIN/DYNACIN     Take 50 mg by mouth        MSM GLUCOSAMINE PO           * OMEPRAZOLE PO           * omeprazole 20 MG CR capsule    priLOSEC    90 capsule    Take 1 capsule (20 mg) by mouth daily    Gastroesophageal reflux disease without esophagitis       VITAMIN D (CHOLECALCIFEROL) PO      Take by mouth daily        * Notice:  This list has 4 medication(s) that are the same as other medications prescribed for you. Read the directions carefully, and ask your doctor or other care provider to review  them with you.

## 2018-07-05 NOTE — LETTER
"7/5/2018       RE: Urbano Magallanes  [address] [facility] [address]     Dear Colleague,    Thank you for referring your patient, Urbano Magallanes, to the [facility] EAR NOSE AND THROAT at [facility]. Please see a copy of my visit note below.    The patient presents with a history of hearing loss and tinnitus.  The patient reports a progressive hearing loss in both ears over the past few years.  The patient denies ear infections, otalgia, otorrhea, or dizziness, but he reports tinnitus and sometimes fullness in the ears, particularly on the right.  The patient denies sinusitis, rhinitis, facial pain, nasal obstruction or purulent nasal discharge. The patient denies chronic or recurrent tonsillitis, chronic or recurrent pharyngitis. His Audiogram and Tympanogram are reviewed with him and they demonstrate bilateral .snl that is mild to severe and worse in the right ear than in the left ear with normal tympanograms and 84% word recognition on the right and 92% word recognition on the left.     This patient is seen in consultation as a self referral to my clinic.     All other systems were reviewed and they are either negative or they are not directly pertinent to this Otolaryngology examination.      Past Medical History:    Past Medical History:   Diagnosis Date     Cancer (H)     skin basal     GERD (gastroesophageal reflux disease)      Hearing problem 1971    Tinnitus     Postoperative hernia     post hernia syndrome      Tinnitus 1971     service  But \"rampted up\" a couple of months ago       Past Surgical History:    Past Surgical History:   Procedure Laterality Date     APPENDECTOMY       ESOPHAGOSCOPY, GASTROSCOPY, DUODENOSCOPY (EGD), COMBINED N/A 6/6/2017    Procedure: COMBINED ESOPHAGOSCOPY, GASTROSCOPY, DUODENOSCOPY (EGD), BIOPSY SINGLE OR MULTIPLE;  gastroscopy;  Surgeon: Jefferson Palmer MD;  Location:  GI     HERNIA REPAIR  1982, 2003, 2015    BI[facility]      MOHS " MICROGRAPHIC PROCEDURE      right scalp; basal cell carcinoma       Medications:      Current Outpatient Prescriptions:      Glucosamine HCl-MSM (MSM GLUCOSAMINE PO), , Disp: , Rfl:      LOW-DOSE ASPIRIN PO, , Disp: , Rfl:      minocycline (MINOCIN/DYNACIN) 50 MG capsule, Take 50 mg by mouth, Disp: , Rfl:      omeprazole (PRILOSEC) 20 MG CR capsule, Take 1 capsule (20 mg) by mouth daily, Disp: 90 capsule, Rfl: 3     OMEPRAZOLE PO, , Disp: , Rfl:      VITAMIN D, CHOLECALCIFEROL, PO, Take by mouth daily, Disp: , Rfl:      minocycline (MINOCIN/DYNACIN) 100 MG capsule, Take 1 pill every morning, Disp: , Rfl:     Allergies:    No known allergies    Physical Examination:    The patient is a well developed, well nourished male in no apparent distress.  He is normocephalic, atraumatic with pupils equally round and reactive to light.    Oral Cavity Examination:  Normal mucosa with no masses or lesions  Nasal Examination: Normal mucosa with no masses or lesions  Ear Examination: Ear canals clear, tympanic membranes and middle ear spaces normal  Neurological Examination: Facial nerve function intact and symmetric  Integumentary Examination: No lesions on the skin of the head and neck  Neck Examination: No masses or lesions, no lymphadenopathy  Endocrine Examination: Normal thyroid examination    Assessment and Plan:    The patient presents with a history of asymmetric sensorineural hearing loss and tinnitus. He will proceed with the use of his hearing aids as recommended by the Audiologist and he will try to change to alternative medications from his Minocycline. He will be referred for an MRI scan of the head to assess for retrocochlear pathology or intracranial pathology. He will be seen again after this testing is completed.     Again, thank you for allowing me to participate in the care of your patient.      Sincerely,    Zac Kay MD

## 2018-07-05 NOTE — PATIENT INSTRUCTIONS
The patient presents with a history of asymmetric sensorineural hearing loss and tinnitus. He will proceed with the use of his hearing aids as recommended by the Audiologist and he will try to change to alternative medications from his Minocycline. He will be referred for an MRI scan of the head to assess for retrocochlear pathology or intracranial pathology. He will be seen again after this testing is completed.

## 2018-07-05 NOTE — PROGRESS NOTES
AUDIOLOGY REPORT    SUMMARY: Audiology visit completed. See audiogram for results.      RECOMMENDATIONS: Follow-up with ENT.      Dwain Patel  Audiologist  [place] License  #[id number]

## 2018-07-21 ENCOUNTER — RADIANT APPOINTMENT (OUTPATIENT)
Dept: MRI IMAGING | Facility: CLINIC | Age: 68
End: 2018-07-21
Attending: OTOLARYNGOLOGY
Payer: COMMERCIAL

## 2018-07-21 DIAGNOSIS — H90.3 SNHL (SENSORY-NEURAL HEARING LOSS), ASYMMETRICAL: ICD-10-CM

## 2018-07-21 RX ORDER — GADOBUTROL 604.72 MG/ML
7.5 INJECTION INTRAVENOUS ONCE
Status: COMPLETED | OUTPATIENT
Start: 2018-07-21 | End: 2018-07-21

## 2018-07-21 RX ADMIN — GADOBUTROL 7.5 ML: 604.72 INJECTION INTRAVENOUS at 13:18

## 2018-07-21 NOTE — DISCHARGE INSTRUCTIONS

## 2018-07-26 ENCOUNTER — OFFICE VISIT (OUTPATIENT)
Dept: OTOLARYNGOLOGY | Facility: CLINIC | Age: 68
End: 2018-07-26
Payer: COMMERCIAL

## 2018-07-26 DIAGNOSIS — H90.3 SNHL (SENSORY-NEURAL HEARING LOSS), ASYMMETRICAL: Primary | ICD-10-CM

## 2018-07-26 ASSESSMENT — PAIN SCALES - GENERAL: PAINLEVEL: NO PAIN (0)

## 2018-07-26 NOTE — LETTER
"7/26/2018       RE: Urbano Magallaens  [address] [address]     Dear Colleague,    Thank you for referring your patient, Urbano Magallanes, to the [facility] EAR NOSE AND THROAT at [facility]. Please see a copy of my visit note below.    The patient presents with a history of hearing loss and tinnitus.  The patient reports a progressive hearing loss in both ears over the past few years.  The patient denies ear infections, otalgia, otorrhea, or dizziness, but he reports tinnitus and sometimes fullness in the ears, particularly on the right. His Audiogram and Tympanogram are reviewed with him and they demonstrate bilateral sensorineural hearing loss that is mild to severe and worse in the right ear than in the left ear with normal tympanograms and 84% word recognition on the right and 92% word recognition on the left. His MRI scan is reviewed with him and this demonstrates no retrocochlear pathology or intracranial pathology.       All other systems were reviewed and they are either negative or they are not directly pertinent to this Otolaryngology examination.      Past Medical History:    Past Medical History:   Diagnosis Date     Cancer (H)     skin basal     GERD (gastroesophageal reflux disease)      Hearing problem 1971    Tinnitus     Postoperative hernia     post hernia syndrome      Tinnitus 1971     service  But \"rampted up\" a couple of months ago       Past Surgical History:    Past Surgical History:   Procedure Laterality Date     APPENDECTOMY       ESOPHAGOSCOPY, GASTROSCOPY, DUODENOSCOPY (EGD), COMBINED N/A 6/6/2017    Procedure: COMBINED ESOPHAGOSCOPY, GASTROSCOPY, DUODENOSCOPY (EGD), BIOPSY SINGLE OR MULTIPLE;  gastroscopy;  Surgeon: Jefferson Palmer MD;  Location:  GI     HERNIA REPAIR  1982, 2003, 2015    [facility]      MOHS MICROGRAPHIC PROCEDURE      right scalp; basal cell carcinoma       Medications:      Current Outpatient Prescriptions:      " Glucosamine HCl-MSM (MSM GLUCOSAMINE PO), , Disp: , Rfl:      LOW-DOSE ASPIRIN PO, , Disp: , Rfl:      minocycline (MINOCIN/DYNACIN) 50 MG capsule, Take 50 mg by mouth, Disp: , Rfl:      omeprazole (PRILOSEC) 20 MG CR capsule, Take 1 capsule (20 mg) by mouth daily, Disp: 90 capsule, Rfl: 3     VITAMIN D, CHOLECALCIFEROL, PO, Take by mouth daily, Disp: , Rfl:     Allergies:    No known allergies    Physical Examination:    The patient is a well developed, well nourished male in no apparent distress.  He is normocephalic, atraumatic with pupils equally round and reactive to light.    Oral Cavity Examination:  Normal mucosa with no masses or lesions  Nasal Examination: Normal mucosa with no masses or lesions  Ear Examination: Ear canals clear, tympanic membranes and middle ear spaces normal  Neurological Examination: Facial nerve function intact and symmetric  Integumentary Examination: No lesions on the skin of the head and neck    Assessment and Plan:    The patient presents with a history of asymmetric sensorineural hearing loss and tinnitus. He will proceed with the use of his hearing aids as recommended by the Audiologist and he will try to change to alternative medications from his Minocycline. His MRI scan of the head is normal. He will be seen again if he develops fullness and decreased hearing in the right ear and an Audiogram and Tympanogram will be obtained at that time.     Again, thank you for allowing me to participate in the care of your patient.      Sincerely,    Zac Kay MD

## 2018-07-26 NOTE — NURSING NOTE
Chief Complaint   Patient presents with     RECHECK     follow up-Tinnitus      Misael Vazquez LPN

## 2018-07-26 NOTE — PATIENT INSTRUCTIONS
The patient presents with a history of asymmetric sensorineural hearing loss and tinnitus. He will proceed with the use of his hearing aids as recommended by the Audiologist and he will try to change to alternative medications from his Minocycline. His MRI scan of the head is normal. He will be seen again if he develops fullness and decreased hearing in the left ear and an Audiogram and Tympanogram will be obtained at that time.

## 2018-07-26 NOTE — PROGRESS NOTES
"The patient presents with a history of hearing loss and tinnitus.  The patient reports a progressive hearing loss in both ears over the past few years.  The patient denies ear infections, otalgia, otorrhea, or dizziness, but he reports tinnitus and sometimes fullness in the ears, particularly on the right. His Audiogram and Tympanogram are reviewed with him and they demonstrate bilateral sensorineural hearing loss that is mild to severe and worse in the right ear than in the left ear with normal tympanograms and 84% word recognition on the right and 92% word recognition on the left. His MRI scan is reviewed with him and this demonstrates no retrocochlear pathology or intracranial pathology.       All other systems were reviewed and they are either negative or they are not directly pertinent to this Otolaryngology examination.      Past Medical History:    Past Medical History:   Diagnosis Date     Cancer (H)     skin basal     GERD (gastroesophageal reflux disease)      Hearing problem 1971    Tinnitus     Postoperative hernia     post hernia syndrome      Tinnitus 1971     service  But \"rampted up\" a couple of months ago       Past Surgical History:    Past Surgical History:   Procedure Laterality Date     APPENDECTOMY       ESOPHAGOSCOPY, GASTROSCOPY, DUODENOSCOPY (EGD), COMBINED N/A 6/6/2017    Procedure: COMBINED ESOPHAGOSCOPY, GASTROSCOPY, DUODENOSCOPY (EGD), BIOPSY SINGLE OR MULTIPLE;  gastroscopy;  Surgeon: Jefferson Palmer MD;  Location:  GI     HERNIA REPAIR  1982, 2003, 2015    [facility]      MOHS MICROGRAPHIC PROCEDURE      right scalp; basal cell carcinoma       Medications:      Current Outpatient Prescriptions:      Glucosamine HCl-MSM (MSM GLUCOSAMINE PO), , Disp: , Rfl:      LOW-DOSE ASPIRIN PO, , Disp: , Rfl:      minocycline (MINOCIN/DYNACIN) 50 MG capsule, Take 50 mg by mouth, Disp: , Rfl:      omeprazole (PRILOSEC) 20 MG CR capsule, Take 1 capsule (20 mg) by mouth daily, Disp: 90 capsule, Rfl: " 3     VITAMIN D, CHOLECALCIFEROL, PO, Take by mouth daily, Disp: , Rfl:     Allergies:    No known allergies    Physical Examination:    The patient is a well developed, well nourished male in no apparent distress.  He is normocephalic, atraumatic with pupils equally round and reactive to light.    Oral Cavity Examination:  Normal mucosa with no masses or lesions  Nasal Examination: Normal mucosa with no masses or lesions  Ear Examination: Ear canals clear, tympanic membranes and middle ear spaces normal  Neurological Examination: Facial nerve function intact and symmetric  Integumentary Examination: No lesions on the skin of the head and neck    Assessment and Plan:    The patient presents with a history of asymmetric sensorineural hearing loss and tinnitus. He will proceed with the use of his hearing aids as recommended by the Audiologist and he will try to change to alternative medications from his Minocycline. His MRI scan of the head is normal. He will be seen again if he develops fullness and decreased hearing in the right ear and an Audiogram and Tympanogram will be obtained at that time.

## 2018-07-26 NOTE — MR AVS SNAPSHOT
After Visit Summary   7/26/2018    Urbano Magallanes    MRN: [id number]           Patient Information     Date Of Birth          1950        Visit Information        Provider Department      7/26/2018 2:45 PM Zac Kay MD M [facility] Ear Nose and Throat        Today's Diagnoses     SNHL (sensory-neural hearing loss), asymmetrical    -  1      Care Instructions    The patient presents with a history of asymmetric sensorineural hearing loss and tinnitus. He will proceed with the use of his hearing aids as recommended by the Audiologist and he will try to change to alternative medications from his Minocycline. His MRI scan of the head is normal. He will be seen again if he develops fullness and decreased hearing in the left ear and an Audiogram and Tympanogram will be obtained at that time.           Follow-ups after your visit        Your next 10 appointments already scheduled     Jul 26, 2018  2:45 PM CDT   (Arrive by 2:30 PM)   Return Visit with MD GARY Landry [facility] Ear Nose and Throat ([facility] [place])    [address] [address] [phone]              Who to contact     Please call your clinic at [phone] to:    Ask questions about your health    Make or cancel appointments    Discuss your medicines    Learn about your test results    Speak to your doctor            Additional Information About Your Visit        [employer] Information     [employer] gives you secure access to your electronic health record. If you see a primary care provider, you can also send messages to your care team and make appointments. If you have questions, please call your primary care clinic.  If you do not have a primary care provider, please call [phone] and they will assist you.      [employer] is an electronic gateway that provides easy, online access to your medical records. With [employer], you can request a clinic appointment, read  your test results, renew a prescription or communicate with your care team.     To access your existing account, please contact your [facility] or call [phone] for assistance.        Care EveryWhere ID     This is your Care EveryWhere ID. This could be used by other organizations to access your [place] medical records  [id number]         Blood Pressure from Last 3 Encounters:   06/21/18 111/70   08/07/17 105/64   08/01/17 127/56    Weight from Last 3 Encounters:   07/05/18 74.8 kg (165 lb)   06/21/18 74.8 kg (165 lb)   08/07/17 75.3 kg (166 lb)              We Performed the Following     AUDIO REVIEW/CONSULT        Primary Care Provider Office Phone # Fax #    Andrea Cruz [phone] [address] [facility] [address]  [place] [address]        Equal Access to Services     [facility]: Hadii aad ku hadasho Soomaali, waaxda luqadaha, qaybta kaalmada adeegyada, waxay samara hayrey chavez . So [facility] [phone].    ATENCIÓN: Si habla español, tiene a lincoln disposición servicios gratuitos de asistencia lingüística. Luisbladimir al [phone].    We comply with applicable federal civil rights laws and [place] laws. We do not discriminate on the basis of race, color, national origin, age, disability, sex, sexual orientation, or gender identity.            Thank you!     Thank you for choosing [facility] EAR NOSE AND THROAT  for your care. Our goal is always to provide you with excellent care. Hearing back from our patients is one way we can continue to improve our services. Please take a few minutes to complete the written survey that you may receive in the mail after your visit with us. Thank you!             Your Updated Medication List - Protect others around you: Learn how to safely use, store and throw away your medicines at www.disposemymeds.org.          This list is accurate as of 7/26/18  2:00 PM.  Always use your most recent med list.                    Brand Name Dispense Instructions for use Diagnosis    LOW-DOSE ASPIRIN PO           minocycline 50 MG capsule    MINOCIN/DYNACIN     Take 50 mg by mouth        MSM GLUCOSAMINE PO           omeprazole 20 MG CR capsule    priLOSEC    90 capsule    Take 1 capsule (20 mg) by mouth daily    Gastroesophageal reflux disease without esophagitis       VITAMIN D (CHOLECALCIFEROL) PO      Take by mouth daily

## 2018-08-10 ENCOUNTER — OFFICE VISIT (OUTPATIENT)
Dept: OTOLARYNGOLOGY | Facility: CLINIC | Age: 68
End: 2018-08-10
Payer: COMMERCIAL

## 2018-08-10 ENCOUNTER — OFFICE VISIT (OUTPATIENT)
Dept: AUDIOLOGY | Facility: CLINIC | Age: 68
End: 2018-08-10
Payer: COMMERCIAL

## 2018-08-10 VITALS — HEIGHT: 74 IN | WEIGHT: 165 LBS | BODY MASS INDEX: 21.17 KG/M2

## 2018-08-10 DIAGNOSIS — H90.3 SENSORY HEARING LOSS, BILATERAL: Primary | ICD-10-CM

## 2018-08-10 DIAGNOSIS — H90.3 SENSORINEURAL HEARING LOSS (SNHL) OF BOTH EARS: Primary | ICD-10-CM

## 2018-08-10 ASSESSMENT — PAIN SCALES - GENERAL: PAINLEVEL: NO PAIN (0)

## 2018-08-10 NOTE — MR AVS SNAPSHOT
After Visit Summary   8/10/2018    Urbano Magallanes    MRN: [id number]           Patient Information     Date Of Birth          1950        Visit Information        Provider Department      8/10/2018 8:30 AM Madison Patel AuD M [facility] Audiology        Today's Diagnoses     Sensory hearing loss, bilateral    -  1       Follow-ups after your visit        Your next 10 appointments already scheduled     Aug 10, 2018 10:00 AM CDT   (Arrive by 9:45 AM)   Return Visit with MD GARY Landry [facility] Ear Nose and Throat ([facility] [place])    [address] [place] [address] [phone]              Who to contact     Please call your clinic at [phone] to:    Ask questions about your health    Make or cancel appointments    Discuss your medicines    Learn about your test results    Speak to your doctor            Additional Information About Your Visit        MyChart Information     mphoria gives you secure access to your electronic health record. If you see a primary care provider, you can also send messages to your care team and make appointments. If you have questions, please call your primary care clinic.  If you do not have a primary care provider, please call [phone] and they will assist you.      [employer]oria is an electronic gateway that provides easy, online access to your medical records. With mphoria, you can request a clinic appointment, read your test results, renew a prescription or communicate with your care team.     To access your existing account, please contact your [facility] or call [phone] for assistance.        Care EveryWhere ID     This is your Care EveryWhere ID. This could be used by other organizations to access your [place] medical records  [id number]         Blood Pressure from Last 3 Encounters:   06/21/18 111/70   08/07/17 105/64   08/01/17 127/56    Weight from Last 3  Encounters:   07/05/18 74.8 kg (165 lb)   06/21/18 74.8 kg (165 lb)   08/07/17 75.3 kg (166 lb)              We Performed the Following     [facility] Audiometry Thrshld Eval & Speech Recog ([address])     Tymps / Reflex   ([id number])        Primary Care Provider Office Phone # Fax #    Andrea Cruz [phone] [address] [facility] [address]  [facility] [address]        Equal Access to Services     [facility]: Hadii aad ku hadasho Soomaali, waaxda luqadaha, qaybta kaalmada adeegyada, waxay idiin hayaan adeeg kharatatyana chavez . So [facility] [phone].    ATENCIÓN: Si habla español, tiene a lincoln disposición servicios gratuitos de asistencia lingüística. [facility] [phone].    We comply with applicable federal civil rights laws and [place] laws. We do not discriminate on the basis of race, color, national origin, age, disability, sex, sexual orientation, or gender identity.            Thank you!     Thank you for choosing [facility] AUDIOLOGY  for your care. Our goal is always to provide you with excellent care. Hearing back from our patients is one way we can continue to improve our services. Please take a few minutes to complete the written survey that you may receive in the mail after your visit with us. Thank you!             Your Updated Medication List - Protect others around you: Learn how to safely use, store and throw away your medicines at www.disposemymeds.org.          This list is accurate as of 8/10/18  9:21 AM.  Always use your most recent med list.                   Brand Name Dispense Instructions for use Diagnosis    LOW-DOSE ASPIRIN PO           minocycline 50 MG capsule    MINOCIN/DYNACIN     Take 50 mg by mouth        MSM GLUCOSAMINE PO           omeprazole 20 MG CR capsule    priLOSEC    90 capsule    Take 1 capsule (20 mg) by mouth daily    Gastroesophageal reflux disease without esophagitis       VITAMIN D (CHOLECALCIFEROL) PO      Take by mouth daily

## 2018-08-10 NOTE — PROGRESS NOTES
"The patient presents with a history of hearing loss and tinnitus.  The patient reports a progressive hearing loss in both ears over the past few years.  The patient denies ear infections, otalgia, otorrhea, or dizziness, but he reports tinnitus and sometimes fullness in the ears, particularly on the right. His Audiogram and Tympanogram are reviewed with him and they demonstrate bilateral sensorineural hearing loss that is now symmetric. His MRI scan is reviewed with him and this demonstrates no retrocochlear pathology or intracranial pathology. His case has been reviewed with Dr. Morgan Gonzáles who feels that this may be a sensitivity to doxycycline.       All other systems were reviewed and they are either negative or they are not directly pertinent to this Otolaryngology examination.      Past Medical History:    Past Medical History:   Diagnosis Date     Cancer (H)     skin basal     GERD (gastroesophageal reflux disease)      Hearing problem 1971    Tinnitus     Postoperative hernia     post hernia syndrome      Tinnitus 1971     service  But \"rampted up\" a couple of months ago       Past Surgical History:    Past Surgical History:   Procedure Laterality Date     APPENDECTOMY       ESOPHAGOSCOPY, GASTROSCOPY, DUODENOSCOPY (EGD), COMBINED N/A 6/6/2017    Procedure: COMBINED ESOPHAGOSCOPY, GASTROSCOPY, DUODENOSCOPY (EGD), BIOPSY SINGLE OR MULTIPLE;  gastroscopy;  Surgeon: Jefferson Palmer MD;  Location:  GI     HERNIA REPAIR  1982, 2003, 2015    [facility]      MOHS MICROGRAPHIC PROCEDURE      right scalp; basal cell carcinoma       Medications:      Current Outpatient Prescriptions:      Glucosamine HCl-MSM (MSM GLUCOSAMINE PO), , Disp: , Rfl:      LOW-DOSE ASPIRIN PO, , Disp: , Rfl:      minocycline (MINOCIN/DYNACIN) 50 MG capsule, Take 50 mg by mouth, Disp: , Rfl:      omeprazole (PRILOSEC) 20 MG CR capsule, Take 1 capsule (20 mg) by mouth daily, Disp: 90 capsule, Rfl: 3     VITAMIN D, CHOLECALCIFEROL, PO, " Take by mouth daily, Disp: , Rfl:     Allergies:    No known allergies    Physical Examination:    The patient is a well developed, well nourished male in no apparent distress.  He is normocephalic, atraumatic with pupils equally round and reactive to light.    Oral Cavity Examination:  Normal mucosa with no masses or lesions  Nasal Examination: Normal mucosa with no masses or lesions  Ear Examination: Ear canals clear, tympanic membranes and middle ear spaces normal  Neurological Examination: Facial nerve function intact and symmetric  Integumentary Examination: No lesions on the skin of the head and neck    Assessment and Plan:    The patient presents with a history of asymmetric sensorineural hearing loss and tinnitus that has significantly improved and is now a symmetric sensorineural hearing loss. He will proceed with the use of his hearing aids as recommended by the Audiologist and he will try to change to alternative medications from his Minocycline. His MRI scan of the head is normal. He will be seen again if he develops fullness and decreased hearing in the right ear and an Audiogram and Tympanogram will be obtained at that time. He will follow up with medical neurotologists Dr. Morgan Gonzáles or Dr. Rick Nissen.

## 2018-08-10 NOTE — PATIENT INSTRUCTIONS
The patient presents with a history of asymmetric sensorineural hearing loss and tinnitus that has significantly improved and is now a symmetric sensorineural hearing loss. He will proceed with the use of his hearing aids as recommended by the Audiologist and he will try to change to alternative medications from his Minocycline. His MRI scan of the head is normal. He will be seen again if he develops fullness and decreased hearing in the right ear and an Audiogram and Tympanogram will be obtained at that time. He will follow up with medical neurotologists Dr. Morgan Gonzáles or Dr. Rick Nissen.

## 2018-08-10 NOTE — LETTER
"8/10/2018       RE: Urbano Magallanes  [address] [facility] [address]     Dear Colleague,    Thank you for referring your patient, Urbano Magallanes, to the [facility] EAR NOSE AND THROAT at [facility]. Please see a copy of my visit note below.    The patient presents with a history of hearing loss and tinnitus.  The patient reports a progressive hearing loss in both ears over the past few years.  The patient denies ear infections, otalgia, otorrhea, or dizziness, but he reports tinnitus and sometimes fullness in the ears, particularly on the right. His Audiogram and Tympanogram are reviewed with him and they demonstrate bilateral sensorineural hearing loss that is now symmetric. His MRI scan is reviewed with him and this demonstrates no retrocochlear pathology or intracranial pathology. His case has been reviewed with Dr. Morgan Gonzáles who feels that this may be a sensitivity to doxycycline.     All other systems were reviewed and they are either negative or they are not directly pertinent to this Otolaryngology examination.      Past Medical History:    Past Medical History:   Diagnosis Date     Cancer (H)     skin basal     GERD (gastroesophageal reflux disease)      Hearing problem 1971    Tinnitus     Postoperative hernia     post hernia syndrome      Tinnitus 1971     service  But \"rampted up\" a couple of months ago       Past Surgical History:    Past Surgical History:   Procedure Laterality Date     APPENDECTOMY       ESOPHAGOSCOPY, GASTROSCOPY, DUODENOSCOPY (EGD), COMBINED N/A 6/6/2017    Procedure: COMBINED ESOPHAGOSCOPY, GASTROSCOPY, DUODENOSCOPY (EGD), BIOPSY SINGLE OR MULTIPLE;  gastroscopy;  Surgeon: Jefferson Palmer MD;  Location:  GI     HERNIA REPAIR  1982, 2003, 2015    [facility]      MOHS MICROGRAPHIC PROCEDURE      right scalp; basal cell carcinoma     Medications:    Current Outpatient Prescriptions:      Glucosamine HCl-MSM (MSM GLUCOSAMINE PO), , Disp: , " Rfl:      LOW-DOSE ASPIRIN PO, , Disp: , Rfl:      minocycline (MINOCIN/DYNACIN) 50 MG capsule, Take 50 mg by mouth, Disp: , Rfl:      omeprazole (PRILOSEC) 20 MG CR capsule, Take 1 capsule (20 mg) by mouth daily, Disp: 90 capsule, Rfl: 3     VITAMIN D, CHOLECALCIFEROL, PO, Take by mouth daily, Disp: , Rfl:     Allergies:    No known allergies    Physical Examination:    The patient is a well developed, well nourished male in no apparent distress.  He is normocephalic, atraumatic with pupils equally round and reactive to light.    Oral Cavity Examination:  Normal mucosa with no masses or lesions  Nasal Examination: Normal mucosa with no masses or lesions  Ear Examination: Ear canals clear, tympanic membranes and middle ear spaces normal  Neurological Examination: Facial nerve function intact and symmetric  Integumentary Examination: No lesions on the skin of the head and neck    Assessment and Plan:    The patient presents with a history of asymmetric sensorineural hearing loss and tinnitus that has significantly improved and is now a symmetric sensorineural hearing loss. He will proceed with the use of his hearing aids as recommended by the Audiologist and he will try to change to alternative medications from his Minocycline. His MRI scan of the head is normal. He will be seen again if he develops fullness and decreased hearing in the right ear and an Audiogram and Tympanogram will be obtained at that time. He will follow up with medical neurotologists Dr. Morgan Gonzáles or Dr. Rick Nissen.     Again, thank you for allowing me to participate in the care of your patient.      Sincerely,    Zac Kay MD

## 2018-08-10 NOTE — PROGRESS NOTES
AUDIOLOGY REPORT    SUMMARY: Audiology visit completed. See audiogram for results.      RECOMMENDATIONS: Follow-up with ENT.      Triny Bryant, CCC-A  Licensed Audiologist  [place] #[id number]

## 2018-08-10 NOTE — MR AVS SNAPSHOT
After Visit Summary   8/10/2018    Urbano Magallanes    MRN: [id number]           Patient Information     Date Of Birth          1950        Visit Information        Provider Department      8/10/2018 10:00 AM Zac Kay MD [facility] Ear Nose and Throat        Today's Diagnoses     Sensorineural hearing loss (SNHL) of both ears    -  1      Care Instructions    The patient presents with a history of asymmetric sensorineural hearing loss and tinnitus that has significantly improved and is now a symmetric sensorineural hearing loss. He will proceed with the use of his hearing aids as recommended by the Audiologist and he will try to change to alternative medications from his Minocycline. His MRI scan of the head is normal. He will be seen again if he develops fullness and decreased hearing in the right ear and an Audiogram and Tympanogram will be obtained at that time. He will follow up with medical neurotologists Dr. Morgan Gonzáles or Dr. Rick Nissen.           Follow-ups after your visit        Your next 10 appointments already scheduled     Sep 25, 2018  8:00 AM CDT   (Arrive by 7:45 AM)   NEW NEUROTOLOGY VISIT with Rick L Nissen, MD   [facility] Ear Nose and Throat ([facility] [place])    [address] [place] [address] [phone]              Who to contact     Please call your clinic at [phone] to:    Ask questions about your health    Make or cancel appointments    Discuss your medicines    Learn about your test results    Speak to your doctor            Additional Information About Your Visit        MyChart Information     [employer] gives you secure access to your electronic health record. If you see a primary care provider, you can also send messages to your care team and make appointments. If you have questions, please call your primary care clinic.  If you do not have a primary care provider, please call [phone] and they will  "assist you.      [employer] is an electronic gateway that provides easy, online access to your medical records. With [employer], you can request a clinic appointment, read your test results, renew a prescription or communicate with your care team.     To access your existing account, please contact your [facility] or call [phone] for assistance.        Care EveryWhere ID     This is your Care EveryWhere ID. This could be used by other organizations to access your [place] medical records  [id number]        Your Vitals Were     Height BMI (Body Mass Index)                1.87 m (6' 1.62\") 21.4 kg/m2           Blood Pressure from Last 3 Encounters:   06/21/18 111/70   08/07/17 105/64   08/01/17 127/56    Weight from Last 3 Encounters:   08/10/18 74.8 kg (165 lb)   07/05/18 74.8 kg (165 lb)   06/21/18 74.8 kg (165 lb)              Today, you had the following     No orders found for display       Primary Care Provider Office Phone # Fax #    Andrea Cruz [phone] [address] [facility] [address]  [place] [address]        Equal Access to Services     [facility]: Hadii aad ku haddesireo Soceline, waaxda luqadaha, qaybta kaalmada adedianneyada, efe mathews. So [facility] [phone].    ATENCIÓN: Si habla español, tiene a lincoln disposición servicios gratuitos de asistencia lingüística. LuisPremier [facility] [phone].    We comply with applicable federal civil rights laws and [place] laws. We do not discriminate on the basis of race, color, national origin, age, disability, sex, sexual orientation, or gender identity.            Thank you!     Thank you for choosing [facility] EAR NOSE AND THROAT  for your care. Our goal is always to provide you with excellent care. Hearing back from our patients is one way we can continue to improve our services. Please take a few minutes to complete the written survey that you may receive in the mail after your visit with us. " Thank you!             Your Updated Medication List - Protect others around you: Learn how to safely use, store and throw away your medicines at www.disposemymeds.org.          This list is accurate as of 8/10/18 10:11 AM.  Always use your most recent med list.                   Brand Name Dispense Instructions for use Diagnosis    LOW-DOSE ASPIRIN PO           minocycline 50 MG capsule    MINOCIN/DYNACIN     Take 50 mg by mouth        MSM GLUCOSAMINE PO           omeprazole 20 MG CR capsule    priLOSEC    90 capsule    Take 1 capsule (20 mg) by mouth daily    Gastroesophageal reflux disease without esophagitis       VITAMIN D (CHOLECALCIFEROL) PO      Take by mouth daily

## 2018-08-10 NOTE — NURSING NOTE
"Chief Complaint   Patient presents with     RECHECK     follow up ear fullness      Height 1.87 m (6' 1.62\"), weight 74.8 kg (165 lb).    Misael Vazquez LPN    "

## 2018-09-20 NOTE — TELEPHONE ENCOUNTER
FUTURE VISIT INFORMATION      FUTURE VISIT INFORMATION:    Date: 9-25-18    Time:     Location:  REFERRAL INFORMATION:    Referring provider:  Dr Kay    Referring providers clinic:      Reason for visit/diagnosis  SNHL     RECORDS REQUESTED FROM:       Clinic name Comments Records Status Imaging Status   All records internal                                       RECORDS STATUS

## 2018-09-24 ENCOUNTER — TELEPHONE (OUTPATIENT)
Dept: FAMILY MEDICINE | Facility: CLINIC | Age: 68
End: 2018-09-24

## 2018-09-24 DIAGNOSIS — K21.9 GASTROESOPHAGEAL REFLUX DISEASE WITHOUT ESOPHAGITIS: ICD-10-CM

## 2018-09-24 RX ORDER — OMEPRAZOLE 10 MG/1
CAPSULE, DELAYED RELEASE ORAL
Qty: 90 CAPSULE | Refills: 0 | Status: SHIPPED | OUTPATIENT
Start: 2018-09-24 | End: 2019-08-16 | Stop reason: DRUGHIGH

## 2018-09-24 NOTE — TELEPHONE ENCOUNTER
Routing to Dr Cruz.     Called and spoke with patient.   States he wants to discontinue Omeprazole, if possible.  Concerned about long term side effects.    Hoping to discontinue gradually.   Plans to decrease from 20mg to 10mg daily---then eventually every other day, etc---until stops.      Pended new RX Omeprazole 10mg tabs.   Please review and sign if OK.     Altagracia MCNALLY RN,BSN

## 2018-09-24 NOTE — TELEPHONE ENCOUNTER
Called and informed patient that new RX Omeprazole sent to pharmacy.     Altagracia MCNALLY RN,BSN

## 2018-09-24 NOTE — TELEPHONE ENCOUNTER
Reason for Call:  Med Question     Detailed comments: Pt states that he would like to start tapering off of omeprazole (PRILOSEC) 20 MG CR capsule to hopefully 10 MG or nothing  He states that he plans to taper off for 4-5 months     He is requesting an Rx for Omeprazole for 10MG       [id number] - [place], [place] - [address] [facility]      Phone Number Patient can be reached at: Home number on file [phone] (home)    Best Time: any    Can we leave a detailed message on this number? YES    Call taken on 9/24/2018 at 3:52 PM by Verona Mcadams

## 2018-09-25 ENCOUNTER — PRE VISIT (OUTPATIENT)
Dept: OTOLARYNGOLOGY | Facility: CLINIC | Age: 68
End: 2018-09-25

## 2018-09-25 ENCOUNTER — OFFICE VISIT (OUTPATIENT)
Dept: OTOLARYNGOLOGY | Facility: CLINIC | Age: 68
End: 2018-09-25
Payer: COMMERCIAL

## 2018-09-25 VITALS — BODY MASS INDEX: 21.3 KG/M2 | HEIGHT: 74 IN | WEIGHT: 166 LBS

## 2018-09-25 DIAGNOSIS — H90.3 BILATERAL SENSORINEURAL HEARING LOSS: Primary | ICD-10-CM

## 2018-09-25 DIAGNOSIS — H93.13 TINNITUS, BILATERAL: ICD-10-CM

## 2018-09-25 ASSESSMENT — PAIN SCALES - GENERAL: PAINLEVEL: NO PAIN (0)

## 2018-09-25 NOTE — MR AVS SNAPSHOT
"              After Visit Summary   9/25/2018    Urbano Magallanes    MRN: [id number]           Patient Information     Date Of Birth          1950        Visit Information        Provider Department      9/25/2018 8:00 AM Nissen, Rick L, MD [facility]        Care Instructions    You will be seen again in 1 year with another hearing test.            Follow-ups after your visit        Who to contact     Please call your clinic at [phone] to:    Ask questions about your health    Make or cancel appointments    Discuss your medicines    Learn about your test results    Speak to your doctor            Additional Information About Your Visit        MyChart Information     The Old Reader gives you secure access to your electronic health record. If you see a primary care provider, you can also send messages to your care team and make appointments. If you have questions, please call your primary care clinic.  If you do not have a primary care provider, please call [phone] and they will assist you.      The Old Reader is an electronic gateway that provides easy, online access to your medical records. With The Old Reader, you can request a clinic appointment, read your test results, renew a prescription or communicate with your care team.     To access your existing account, please contact your [facility] or call [phone] for assistance.        Care EveryWhere ID     This is your Care EveryWhere ID. This could be used by other organizations to access your [facility] medical records  [id number]        Your Vitals Were     Height BMI (Body Mass Index)                1.88 m (6' 2\") 21.31 kg/m2           Blood Pressure from Last 3 Encounters:   06/21/18 111/70   08/07/17 105/64   08/01/17 127/56    Weight from Last 3 Encounters:   09/25/18 75.3 kg (166 lb)   08/10/18 74.8 kg (165 lb)   07/05/18 74.8 kg (165 lb)              Today, you had the following     No orders found for display       " Primary Care Provider Office Phone # Fax #    Andrea Cruz [phone] [address] [address]        Equal Access to Services     JOANN FLETCHER : Hadii aad ku haddesireo Soomaali, waaxda luqadaha, qaybta kaalmada adeegyada, efe oneilrey mathews. So [facility] [phone].    ATENCIÓN: Si habla español, tiene a lincoln disposición servicios gratuitos de asistencia lingüística. Llame al [phone].    We comply with applicable federal civil rights laws and [place] laws. We do not discriminate on the basis of race, color, national origin, age, disability, sex, sexual orientation, or gender identity.            Thank you!     Thank you for choosing [facility]  for your care. Our goal is always to provide you with excellent care. Hearing back from our patients is one way we can continue to improve our services. Please take a few minutes to complete the written survey that you may receive in the mail after your visit with us. Thank you!             Your Updated Medication List - Protect others around you: Learn how to safely use, store and throw away your medicines at www.disposemymeds.org.          This list is accurate as of 9/25/18  8:22 AM.  Always use your most recent med list.                   Brand Name Dispense Instructions for use Diagnosis    LOW-DOSE ASPIRIN PO           minocycline 50 MG capsule    MINOCIN/DYNACIN     Take 50 mg by mouth        MSM GLUCOSAMINE PO           omeprazole 10 MG CR capsule    priLOSEC    90 capsule    Take by mouth 30-60 minutes before a meal.    Gastroesophageal reflux disease without esophagitis       VITAMIN D (CHOLECALCIFEROL) PO      Take by mouth daily

## 2018-09-25 NOTE — LETTER
9/25/2018       RE: Urbano Magallanes  [address] [id number]     Dear Colleague,    Thank you for referring your patient, Urbano Magallanes, to the [facility] at [facility]. Please see a copy of my visit note below.    Dear Andrea Corbett:    I had the pleasure of meeting Urbano Magallanes in consultation today at the [facility] at your request.    CHIEF COMPLAINT:  +    HISTORY OF PRESENT ILLNESS: Patient is a 68-year-old in today for assessment of his ears and hearing.  He has had hearing loss for several years.  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  He has bilateral tinnitus that is constant and stable.  Not problematic, uses a noise maker at night to help sleep.  He has no pain or drainage or adult ear infections.  Denies any dizziness.  He recently was seen and found to have a right increased low-frequency sensorineural hearing loss.  That subsequently recovered.  It is recommended he see ear specialist which brings him in today.  He is not around any noise, is very conscientious about protecting his ears if he is around noise.  He denies any dysphasia, hoarseness, facial paresthesias.  He has had normal MRI scan.  He says he does not notice the right side seems to get worse once or twice a year and may persist for a week or so.    ALLERGIES:    Allergies   Allergen Reactions     No Known Allergies        HABITS:   Alcohol use Yes 0.0 oz/week   Comment: a few beers once or twicw per week    History   Smoking Status     Never Smoker   Smokeless Tobacco     Never Used         PAST MEDICAL HISTORY: Please see today's intake form (for the remainder of the [facility]) which I reviewed and signed.  Past Medical History:   Diagnosis Date     Cancer (H)     skin basal     GERD (gastroesophageal reflux disease)       "Hearing problem     Tinnitus     Postoperative hernia     post hernia syndrome      Tinnitus      service  But \"rampted up\" a couple of months ago       FAMILY HISTORY/SOCIAL HISTORY:   Family History   Problem Relation Age of Onset     Substance Abuse Mother      Other Cancer Father      kidney cancer     Substance Abuse Father      Cancer Father      Kidney cancer     Depression Son      Depression Son       from suicide    Social History     Social History     Marital status:      Spouse name: N/A     Number of children: N/A     Years of education: N/A     Occupational History     Not on file.     Social History Main Topics     Smoking status: Never Smoker     Smokeless tobacco: Never Used     Alcohol use 0.0 oz/week      Comment: a few beers once or twicw per week     Drug use: No     Sexual activity: Yes     Partners: Female     Birth control/ protection: None     Other Topics Concern     Parent/Sibling W/ Cabg, Mi Or Angioplasty Before 65f 55m? No     Social History Narrative       REVIEW OF SYSTEMS: Patient Supplied Answers to Review of Systems   ENT ROS 2018   Ears, Nose, Throat Ringing/noise in ears, Nasal congestion or drainage   Skin -       The remainder of the 10 point ROS is negative    PHYSICIAL EXAMINATION:  Constitutional: The patient was well-groomed and in no acute distress.   Skin: Warm and pink.  Psychiatric: The patient's affect was calm, cooperative, and appropriate.   Respiratory: Breathing comfortably without stridor or exertion of accessory muscles.  Eyes: Pupils were equal and reactive. Extraocular movement intact.   Head: Normocephalic and atraumatic. No lesions or scars.  Ears: Both ears examined the microscope for microscopic assessment and cleaning of cerumen.  Right side was cleaned with curettes.  Following cleaning, TM and middle ear looked normal.  Left ear was cleaned and examined using microscope, curette, and similar techniques.  TM and middle " ear looked normal after cleaning.  Nose: Sinuses were nontender. Anterior rhinoscopy revealed midline septum and absence of purulence or polyps.  Oral Cavity: Normal tongue, floor of mouth, buccal mucosa, and palate. No lesions or masses on inspection or palpation. No abnormal lymph tissue in the oropharynx.   Neck: The parotid is soft without masses. Supple with normal laryngeal and tracheal landmarks.   Lymphatic: There is no palpable lymphadenopathy or other masses in the neck.   Neurologic: Alert and oriented x 3. Cranial nerves III-XI within normal limits. Voice quality normal.  Cerebellar Function Tests:  Grossly normal    Audiogram: Audiogram performed shows a bilateral high-frequency sensorineural hearing loss beginning about 1000 Hz.  Drops to 70 dB at 4000 Hz.  It is symmetrical.  Discrimination 76% on the right and 92% on the left.  Normal tympanograms.    MRI scan: Reviewed and normal per report and review.      IMPRESSION AND PLAN:   1. Bilateral high-frequency sensorineural hearing loss:    Discussed with hi sensorineural hearing loss he has.  He is to protect his ears from noise which he is quite diligent with.  Hearing aids were discussed and he will continue to optimize hearing aid usage.  Discussed the recent test showing a low-frequency loss.  Again it looks symmetrical at this time.  For now we will just monitor and will see him back in 1 year.  Should he have any further episodes, he is to come in for assessment and exam.  Discussed possible autoimmune entities, etc.  With any persistent loss, would consider steroid injection.      2.  Bilateral tinnitus: Discussed secondary to sensorineural hearing loss.  Recommend monitor and noise masker at night which he is using.  Reassured at this point no further treatment needed.      Thank you very much for the opportunity to participate in the care of your patient.    Rick L Nissen MD

## 2018-09-25 NOTE — NURSING NOTE
"Chief Complaint   Patient presents with     Consult     SNHL of both ears     Ht 1.88 m (6' 2\")  Wt 75.3 kg (166 lb)  BMI 21.31 kg/m2    Leisa Light LPN    "

## 2018-09-25 NOTE — PROGRESS NOTES
"Dear Andrea Corbett:    I had the pleasure of meeting Urbano Magallanes in consultation today at the [facility] at your request.    CHIEF COMPLAINT:  +    HISTORY OF PRESENT ILLNESS: Patient is a 68-year-old in today for assessment of his ears and hearing.  He has had hearing loss for several years.  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  He has bilateral tinnitus that is constant and stable.  Not problematic, uses a noise maker at night to help sleep.  He has no pain or drainage or adult ear infections.  Denies any dizziness.  He recently was seen and found to have a right increased low-frequency sensorineural hearing loss.  That subsequently recovered.  It is recommended he see ear specialist which brings him in today.  He is not around any noise, is very conscientious about protecting his ears if he is around noise.  He denies any dysphasia, hoarseness, facial paresthesias.  He has had normal MRI scan.  He says he does not notice the right side seems to get worse once or twice a year and may persist for a week or so.    ALLERGIES:    Allergies   Allergen Reactions     No Known Allergies        HABITS:   Alcohol use Yes 0.0 oz/week   Comment: a few beers once or twicw per week    History   Smoking Status     Never Smoker   Smokeless Tobacco     Never Used         PAST MEDICAL HISTORY: Please see today's intake form (for the remainder of the PMH) which I reviewed and signed.  Past Medical History:   Diagnosis Date     Cancer (H)     skin basal     GERD (gastroesophageal reflux disease)      Hearing problem 1971    Tinnitus     Postoperative hernia     post hernia syndrome      Tinnitus 1971     service  But \"rampted up\" a couple of months ago       FAMILY HISTORY/SOCIAL HISTORY:   Family History   Problem Relation Age of Onset     Substance Abuse Mother      Other " Cancer Father      kidney cancer     Substance Abuse Father      Cancer Father      Kidney cancer     Depression Son      Depression Son       from suicide    Social History     Social History     Marital status:      Spouse name: N/A     Number of children: N/A     Years of education: N/A     Occupational History     Not on file.     Social History Main Topics     Smoking status: Never Smoker     Smokeless tobacco: Never Used     Alcohol use 0.0 oz/week      Comment: a few beers once or twicw per week     Drug use: No     Sexual activity: Yes     Partners: Female     Birth control/ protection: None     Other Topics Concern     Parent/Sibling W/ Cabg, Mi Or Angioplasty Before 65f 55m? No     Social History Narrative       REVIEW OF SYSTEMS: Patient Supplied Answers to Review of Systems   ENT ROS 2018   Ears, Nose, Throat Ringing/noise in ears, Nasal congestion or drainage   Skin -       The remainder of the 10 point ROS is negative    PHYSICIAL EXAMINATION:  Constitutional: The patient was well-groomed and in no acute distress.   Skin: Warm and pink.  Psychiatric: The patient's affect was calm, cooperative, and appropriate.   Respiratory: Breathing comfortably without stridor or exertion of accessory muscles.  Eyes: Pupils were equal and reactive. Extraocular movement intact.   Head: Normocephalic and atraumatic. No lesions or scars.  Ears: Both ears examined the microscope for microscopic assessment and cleaning of cerumen.  Right side was cleaned with curettes.  Following cleaning, TM and middle ear looked normal.  Left ear was cleaned and examined using microscope, curette, and similar techniques.  TM and middle ear looked normal after cleaning.  Nose: Sinuses were nontender. Anterior rhinoscopy revealed midline septum and absence of purulence or polyps.  Oral Cavity: Normal tongue, floor of mouth, buccal mucosa, and palate. No lesions or masses on inspection or palpation. No abnormal lymph  tissue in the oropharynx.   Neck: The parotid is soft without masses. Supple with normal laryngeal and tracheal landmarks.   Lymphatic: There is no palpable lymphadenopathy or other masses in the neck.   Neurologic: Alert and oriented x 3. Cranial nerves III-XI within normal limits. Voice quality normal.  Cerebellar Function Tests:  Grossly normal    Audiogram: Audiogram performed shows a bilateral high-frequency sensorineural hearing loss beginning about 1000 Hz.  Drops to 70 dB at 4000 Hz.  It is symmetrical.  Discrimination 76% on the right and 92% on the left.  Normal tympanograms.    MRI scan: Reviewed and normal per report and review.      IMPRESSION AND PLAN:   1. Bilateral high-frequency sensorineural hearing loss:    Discussed with hi sensorineural hearing loss he has.  He is to protect his ears from noise which he is quite diligent with.  Hearing aids were discussed and he will continue to optimize hearing aid usage.  Discussed the recent test showing a low-frequency loss.  Again it looks symmetrical at this time.  For now we will just monitor and will see him back in 1 year.  Should he have any further episodes, he is to come in for assessment and exam.  Discussed possible autoimmune entities, etc.  With any persistent loss, would consider steroid injection.      2.  Bilateral tinnitus: Discussed secondary to sensorineural hearing loss.  Recommend monitor and noise masker at night which he is using.  Reassured at this point no further treatment needed.      Thank you very much for the opportunity to participate in the care of your patient.    Rick L Nissen MD

## 2018-09-29 ENCOUNTER — HOSPITAL ENCOUNTER (EMERGENCY)
Facility: CLINIC | Age: 68
Discharge: HOME | End: 2018-09-29
Attending: NURSE PRACTITIONER | Admitting: NURSE PRACTITIONER
Payer: MEDICARE

## 2018-09-29 VITALS
WEIGHT: 164 LBS | HEART RATE: 63 BPM | TEMPERATURE: 97.6 F | SYSTOLIC BLOOD PRESSURE: 111 MMHG | DIASTOLIC BLOOD PRESSURE: 72 MMHG | RESPIRATION RATE: 16 BRPM | OXYGEN SATURATION: 100 % | BODY MASS INDEX: 21.05 KG/M2 | HEIGHT: 74 IN

## 2018-09-29 DIAGNOSIS — T14.8XXA SKIN AVULSION: ICD-10-CM

## 2018-09-29 PROCEDURE — 99282 EMERGENCY DEPT VISIT SF MDM: CPT

## 2018-09-29 ASSESSMENT — ENCOUNTER SYMPTOMS: WOUND: 1

## 2018-09-29 NOTE — ED PROVIDER NOTES
"  History     Chief Complaint:  Laceration    The history is provided by the patient.      Urbano Magallanes is a 68 year old right handed male with a history of GERD and basal cell carcinoma who presents to the emergency department for evaluation of a laceration. Last night, the patient was cutting vegetables and states he cut off the tip of his pinky. He reports that his wife put hydrogen peroxide on the cut and wrapped and bandaged it. This morning the patient took off the bandage and it was still bleeding prompting the patient to seek further evaluation here in the ED. Here, the patient complains of the laceration to the left fifth finger, but denies any use of blood thinners. Of note, the patient's last tetanus update was 7/3/12.    Allergies:  NKDA    Medications:    Glucosamine  Minocycline  Omeprazole  Cholecalciferol  Ibuprofen  Aspirin 81 mg  Thiamine     Past Medical History:    Basal Cell Carcinoma  GERD  Inguinal Hernia  Depressive Disorder  Tinnitus   Actinic Keratosis     Past Surgical History:    Appendectomy  Esophagoscopy, Gastroscopy, Duodenoscopy  Hernia Repair  MOHS Procedure    Family History:    Substance Abuse  Kidney Cancer  Depression    Social History:  Marital Status:   [2]  Tobacco Use: No  Alcohol Use: Yes, 4 drinks/week    Review of Systems   Skin: Positive for wound (Left Fifth Finger).   All other systems reviewed and are negative.    Physical Exam     Patient Vitals for the past 24 hrs:   BP Temp Temp src Pulse Resp SpO2 Height Weight   09/29/18 1109 111/72 97.6  F (36.4  C) Oral 63 16 100 % 1.88 m (6' 2\") 74.4 kg (164 lb)     Physical Exam  Nursing notes reviewed. Vitals reviewed.  General: Alert. Well kept.  Eyes:  Conjunctiva non-injected, non-icteric.  Neck/Throat: Moist mucous membranes. Normal voice.  Cardiac: Regular rhythm. Normal heart sounds.  Pulmonary: Clear and equal breath sounds bilaterally.   Musculoskeletal: Normal gross range of motion of all 4 extremities. "   Neurological: Alert and oriented x4.   Skin: Warm and dry. 1 cm skin avulsion, left fifth finger not involving the fingernail. Sensation and motion intact to the distal finger.   Psych: Affect normal. Good eye contact.    Emergency Department Course     Procedures:    Narrative: Procedure: Laceration Repair        LACERATION:  A simple and superficial clean 0.5 cm laceration.      LOCATION:  Left Fifth Finger      FUNCTION:  Distally sensation, circulation, motor and tendon function are intact.      ANESTHESIA:  Local using Lidocaine with epinephrine, total of 0.5 mLs      PREPARATION:  Irrigation and Scrubbing with Normal Saline and Shur Clens      DEBRIDEMENT:  no debridement      CLOSURE:  Wound was closed with Gel Foam      Emergency Department Course:  1118 Nursing notes and vitals reviewed. I performed an exam of the patient as documented above.     1135 I rechecked the patient and discussed the results of his workup thus far. I performed a laceration repair as per the above procedure note.     Findings and plan explained to the patient. Patient discharged home with instructions regarding supportive care, medications, and reasons to return. The importance of close follow-up was reviewed.    I personally answered all related questions prior to discharge.    Impression & Plan      Medical Decision Making:  Urbano Magallanes is a 68 year old male who presents for evaluation of a laceration to the left fifth finger after cutting vegetables.  The wound was carefully evaluated and explored.  The laceration was not amenable to sutures and was closed with gel foam as per the above procedure note.  There is no evidence of muscular, tendon, or bony damage with this laceration.  No signs of foreign body.  Possible complications (infection, scarring) were reviewed with the patient. Tetanus is up date. Patient's questions answered prior to discharge. Patient discharged in stable condition.     Diagnosis:    ICD-10-CM    1.  Skin avulsion T14.8XXA      Disposition:  discharged to home    Scribe Disclosure:  I, Kd Crump, am serving as a scribe on 9/29/2018 at 11:18 AM to personally document services performed by Jenny Reyes CNP based on my observations and the provider's statements to me.     Kd Crump  9/29/2018    EMERGENCY DEPARTMENT       Jenny Reyes CNP  09/29/18 1237

## 2018-09-29 NOTE — ED AVS SNAPSHOT
Emergency Department    [address] [facility] [address]    Phone:  [phone]    Fax:  [phone]                                       Urbano Magallanes   MRN: [id number]    Department:   Emergency Department   Date of Visit:  9/29/2018           Patient Information     Date Of Birth          1950        Your diagnoses for this visit were:     Skin avulsion        You were seen by Jenny Reyes, CNP.      Follow-up Information     Follow up with Andrea Cruz MD.    Specialty:  Internal Medicine    Why:  As needed    Contact information:    [address] [facility] [address]  [facility] [address] [phone]          Follow up with  Emergency Department.    Specialty:  EMERGENCY MEDICINE    Why:  As needed, If symptoms worsen    Contact information:    [address] [facility] [address] [phone]      Discharge References/Attachments     LACERATION, OLD: NOT SUTURED (ENGLISH)    SKIN AVULSION (ENGLISH)      24 Hour Appointment Hotline       To make an appointment at any [facility], call [phone] ([phone]). If you don't have a family doctor or clinic, we will help you find one. [facility] are conveniently located to serve the needs of you and your family.             Review of your medicines      Our records show that you are taking the medicines listed below. If these are incorrect, please call your family doctor or clinic.        Dose / Directions Last dose taken    minocycline 50 MG capsule   Commonly known as:  MINOCIN/DYNACIN   Dose:  50 mg        Take 50 mg by mouth   Refills:  0        MSM GLUCOSAMINE PO        Refills:  0        omeprazole 10 MG CR capsule   Commonly known as:  priLOSEC   Quantity:  90 capsule        Take by mouth 30-60 minutes before a meal.   Refills:  0        VITAMIN D (CHOLECALCIFEROL) PO        Take by mouth daily   Refills:  0                Orders Needing Specimen Collection     None      Pending Results     No orders  found from 9/27/2018 to 9/30/2018.            Pending Culture Results     No orders found from 9/27/2018 to 9/30/2018.            Pending Results Instructions     If you had any lab results that were not finalized at the time of your Discharge, you can call the ED Lab Result RN at [phone]. You will be contacted by this team for any positive Lab results or changes in treatment. The nurses are available 7 days a week from 10A to 6:30P.  You can leave a message 24 hours per day and they will return your call.        Test Results From Your Hospital Stay               Clinical Quality Measure: Blood Pressure Screening     Your blood pressure was checked while you were in the emergency department today. The last reading we obtained was  BP: 111/72 . Please read the guidelines below about what these numbers mean and what you should do about them.  If your systolic blood pressure (the top number) is less than 120 and your diastolic blood pressure (the bottom number) is less than 80, then your blood pressure is normal. There is nothing more that you need to do about it.  If your systolic blood pressure (the top number) is 120-139 or your diastolic blood pressure (the bottom number) is 80-89, your blood pressure may be higher than it should be. You should have your blood pressure rechecked within a year by a primary care provider.  If your systolic blood pressure (the top number) is 140 or greater or your diastolic blood pressure (the bottom number) is 90 or greater, you may have high blood pressure. High blood pressure is treatable, but if left untreated over time it can put you at risk for heart attack, stroke, or kidney failure. You should have your blood pressure rechecked by a primary care provider within the next 4 weeks.  If your provider in the emergency department today gave you specific instructions to follow-up with your doctor or provider even sooner than that, you should follow that instruction and not wait  for up to 4 weeks for your follow-up visit.        Thank you for choosing [facility]       Thank you for choosing [facility] for your care. Our goal is always to provide you with excellent care. Hearing back from our patients is one way we can continue to improve our services. Please take a few minutes to complete the written survey that you may receive in the mail after you visit with us. Thank you!        [facility] Information     [facility] gives you secure access to your electronic health record. If you see a primary care provider, you can also send messages to your care team and make appointments. If you have questions, please call your primary care clinic.  If you do not have a primary care provider, please call [phone] and they will assist you.        Care EveryWhere ID     This is your Care EveryWhere ID. This could be used by other organizations to access your [facility] medical records  [id number]        Equal Access to Services     JOANN FLETCHER : Donn Velasco, benjie mendosa, efe bishop. So [facility] [phone].    ATENCIÓN: Si habla español, tiene a lincoln disposición servicios gratuitos de asistencia lingüística. Llame al [phone].    We comply with applicable federal civil rights laws and [place] laws. We do not discriminate on the basis of race, color, national origin, age, disability, sex, sexual orientation, or gender identity.            After Visit Summary       This is your record. Keep this with you and show to your community pharmacist(s) and doctor(s) at your next visit.

## 2018-09-29 NOTE — ED AVS SNAPSHOT
Emergency Department    [address] [place] [address]    Phone:  [phone]    Fax:  [phone]                                       Urbano Magallanes   MRN: [id number]    Department:   Emergency Department   Date of Visit:  9/29/2018           After Visit Summary Signature Page     I have received my discharge instructions, and my questions have been answered. I have discussed any challenges I see with this plan with the nurse or doctor.    ..........................................................................................................................................  Patient/Patient Representative Signature      ..........................................................................................................................................  Patient Representative Print Name and Relationship to Patient    ..................................................               ................................................  Date                                   Time    ..........................................................................................................................................  Reviewed by Signature/Title    ...................................................              ..............................................  Date                                               Time          22EPIC Rev 08/18

## 2018-10-08 ENCOUNTER — TELEPHONE (OUTPATIENT)
Dept: FAMILY MEDICINE | Facility: CLINIC | Age: 68
End: 2018-10-08

## 2018-10-08 NOTE — TELEPHONE ENCOUNTER
I am not sure what an HCL supplement is.  He can take miralax if he gets constipated while traveling.  It is sold in travel sized pouches.

## 2018-10-08 NOTE — TELEPHONE ENCOUNTER
Reason for Call:  Med Question     Detailed comments: Pt is going to attempt to taper off omeprazole (PRILOSEC) 10 MG CR capsule he is wondering if it is safe to take HCL supplement while he is still on Omeprazole     Pt is also wondering what a good laxative he could take states he is traveling to [place] for 2. 5 weeks and in the past has gotten very constipated     Phone Number Patient can be reached at: Home number on file [phone] (home)    Best Time: any    Can we leave a detailed message on this number? YES    Call taken on 10/8/2018 at 1:25 PM by Verona Mcadams

## 2018-10-09 NOTE — TELEPHONE ENCOUNTER
Taking hydrochloric acid supplements while trying to get off omeprazole seems a little like chasing one's tail.    I do not see any advantage to taking a hydrochloric acid supplement, I think they will exacerbate GERD symptoms and it has no proven clinical benefit in rigorous scientific studies.    Since he is struggling with rebound hyperacidity from stopping his PPI (omeprazole), a slow taper is advisable.  If he would like to start taking omeprazole every other day for about a month and then every 3rd day for a month and then every 4th day for a month and then stop, that might be a reasonable schedule.  He can use over the counter ranitidine (Zantac) as needed on the days that he does not take omeprazole to help with any breakthrough heart burn / reflux symptoms that may occur on days that he does not take omeprazole.  It can be a hard process to get off of omeprazole, but he can keep trying.

## 2018-10-09 NOTE — TELEPHONE ENCOUNTER
DOMINGUEZ to : Called pt. Pt reports that HCL is Hydrochloric acid as in stomach acid that can be purchased at a health food store. Pt advised that he takes omeprazole to reduce stomach acid. Not sure how HCL is going to help him. Pt understood and stated that he was concerned about the reduction in stomach acid and that he felt vulnerable to illness. Pt advised to try OTC probiotic(acidopholus) which can help boost his immune system and help to prevent constipation while in [place]. 's recommendation to take miralax while traveling was given to pt as well. Pt also reports that he is concerned about being on omeprazole because he has noticed some short term memory loss and some acne. Pt states that he has tried to discontinue omeprazole but the acid discomfort returns. Pt does not want  to prescribe a different acid reduction medication. Pt has decided that he will try to reduce his dose over a period of 2 months to see if he can get along with a lower strength.

## 2018-11-08 ENCOUNTER — TELEPHONE (OUTPATIENT)
Dept: FAMILY MEDICINE | Facility: CLINIC | Age: 68
End: 2018-11-08

## 2018-11-08 DIAGNOSIS — K21.9 GASTROESOPHAGEAL REFLUX DISEASE, ESOPHAGITIS PRESENCE NOT SPECIFIED: Primary | ICD-10-CM

## 2018-11-08 RX ORDER — OMEPRAZOLE 10 MG/1
CAPSULE, DELAYED RELEASE ORAL
Qty: 90 CAPSULE | Refills: 3 | Status: SHIPPED | OUTPATIENT
Start: 2018-11-08 | End: 2019-07-31

## 2018-11-08 NOTE — TELEPHONE ENCOUNTER
Reason for Call:  Other Medication update    Detailed comments: The patient called about his Omeprazole RX and the Pharmacy said he pick it up in Sept  The patient said that he takes 1 pill a day between Breakfast and lunch  He is going to do a long taper where he takes 10 MG and 20 Mg tablets on different to try to dercease the amount he takes slowly  He is going to try to get the 10 Mg tablets from [email or web address] but may need a verbal ok to P/U Early     Phone Number Patient can be reached at: Home number on file [phone] (home)    Best Time: anytime    Can we leave a detailed message on this number? YES    Call taken on 11/8/2018 at 11:09 AM by Milagros Quezada

## 2018-11-08 NOTE — TELEPHONE ENCOUNTER
"TO PCP:    Spoke with patient     He is trying to do a \"slow taper\" off of Omeprazole    Currently has 20mg pills- taking 1 daily    He has a script on file for 10mg capsules at [facility] but states they need an early refill authorization to fill them    He plans to take 20mg alternating with 10mg every other day then decrease to 10mg daily, then eventually off omeprazole.     Please advise if okay with patient's plan, and if okay to give pharmacy authorization to fill 10mg capsules now?    Narcisa ACHARYA RN    "

## 2019-01-15 ENCOUNTER — TRANSFERRED RECORDS (OUTPATIENT)
Dept: HEALTH INFORMATION MANAGEMENT | Facility: CLINIC | Age: 69
End: 2019-01-15

## 2019-01-21 ENCOUNTER — MEDICAL CORRESPONDENCE (OUTPATIENT)
Dept: HEALTH INFORMATION MANAGEMENT | Facility: CLINIC | Age: 69
End: 2019-01-21

## 2019-07-31 ENCOUNTER — OFFICE VISIT (OUTPATIENT)
Dept: FAMILY MEDICINE | Facility: CLINIC | Age: 69
End: 2019-07-31
Payer: COMMERCIAL

## 2019-07-31 VITALS
DIASTOLIC BLOOD PRESSURE: 60 MMHG | OXYGEN SATURATION: 99 % | HEART RATE: 68 BPM | TEMPERATURE: 97.5 F | HEIGHT: 74 IN | WEIGHT: 159.3 LBS | SYSTOLIC BLOOD PRESSURE: 124 MMHG | BODY MASS INDEX: 20.44 KG/M2

## 2019-07-31 DIAGNOSIS — K21.9 GASTROESOPHAGEAL REFLUX DISEASE WITHOUT ESOPHAGITIS: ICD-10-CM

## 2019-07-31 DIAGNOSIS — Z00.00 ROUTINE GENERAL MEDICAL EXAMINATION AT A HEALTH CARE FACILITY: Primary | ICD-10-CM

## 2019-07-31 PROCEDURE — 99397 PER PM REEVAL EST PAT 65+ YR: CPT | Performed by: INTERNAL MEDICINE

## 2019-07-31 RX ORDER — BUTYROSPERMUM PARKII(SHEA BUTTER), SIMMONDSIA CHINENSIS (JOJOBA) SEED OIL, ALOE BARBADENSIS LEAF EXTRACT .01; 1; 3.5 G/100G; G/100G; G/100G
LIQUID TOPICAL EVERY OTHER DAY
COMMUNITY
End: 2021-03-22

## 2019-07-31 ASSESSMENT — MIFFLIN-ST. JEOR: SCORE: 1544.39

## 2019-07-31 ASSESSMENT — ACTIVITIES OF DAILY LIVING (ADL): CURRENT_FUNCTION: NO ASSISTANCE NEEDED

## 2019-07-31 NOTE — PROGRESS NOTES
SUBJECTIVE:   Urbano Magallanes is a 68 year old male who presents for Preventive Visit.    Are you in the first 12 months of your Medicare coverage?  No, Right eye 20/20, Left eye 20/20  Both 20/20 with corrective lens    Healthy Habits:     In general, how would you rate your overall health?  Good    Frequency of exercise:  4-5 days/week    Duration of exercise:  45-60 minutes    Taking medications regularly:  Yes    Barriers to taking medications:  None    Medication side effects:  None    Ability to successfully perform activities of daily living:  No assistance needed    Home Safety:  No safety concerns identified    Hearing impairment symptoms: hearing aids.    In the past 6 months, have you been bothered by leaking of urine?  No    In general, how would you rate your overall mental or emotional health?  Very good      PHQ-2 Total Score: 0    Additional concerns today:  Yes (Right groin, canker sores)    Do you feel safe in your environment? Yes    Do you have a Health Care Directive? Yes: Patient states has Advance Directive and will bring in a copy to clinic.      Fall risk  Fallen 2 or more times in the past year?: No  Any fall with injury in the past year?: No    Cognitive Screening   1) Repeat 3 items (Leader, Season, Table)    2) Clock draw: NORMAL  3) 3 item recall: Recalls 1 object   Results: NORMAL clock, 1-2 items recalled: COGNITIVE IMPAIRMENT LESS LIKELY    Mini-CogTM Copyright MARLEN Barclay. Licensed by the author for use in [facility]; reprinted with permission (liz@.[facility]). All rights reserved.      Do you have sleep apnea, excessive snoring or daytime drowsiness?: no    Reviewed and updated as needed this visit by clinical staff  Tobacco  Allergies  Meds  Soc Hx        Reviewed and updated as needed this visit by Provider        Social History     Tobacco Use     Smoking status: Never Smoker     Smokeless tobacco: Never Used   Substance Use Topics     Alcohol use: Yes      Alcohol/week: 2.4 oz     Types: 4 Cans of beer per week     Comment: a few beers once or twice per week     If you drink alcohol do you typically have >3 drinks per day or >7 drinks per week? No    Alcohol Use 7/31/2019   Prescreen: >3 drinks/day or >7 drinks/week? No         Current providers sharing in care for this patient include:   Patient Care Team:  Andrea Cruz MD as PCP - General (Internal Medicine)  Andrea Cruz MD as Assigned PCP    The following health maintenance items are reviewed in Epic and correct as of today:  Health Maintenance   Topic Date Due     ADVANCE CARE PLANNING  1950     DEPRESSION ACTION PLAN  1950     PHQ-9  1950     COLONOSCOPY  10/18/2016     MEDICARE ANNUAL WELLNESS VISIT  06/21/2019     AORTIC ANEURYSM SCREENING (SYSTEM ASSIGNED)  08/31/2019 (Originally 8/5/2013)     INFLUENZA VACCINE (1) 09/01/2019     FALL RISK ASSESSMENT  09/25/2019     LIPID  02/24/2022     DTAP/TDAP/TD IMMUNIZATION (2 - Td) 07/03/2022     HEPATITIS C SCREENING  Completed     ZOSTER IMMUNIZATION  Completed     IPV IMMUNIZATION  Aged Out     MENINGITIS IMMUNIZATION  Aged Out     Patient Active Problem List   Diagnosis     Right inguinal pain     Gastroesophageal reflux disease without esophagitis     Actinic keratosis     Backache     Depressive disorder     Elevated PSA     Hearing loss     History of basal cell carcinoma     History of disease of skin and subcutaneous tissue     History of dysplastic nevus     History of squamous cell carcinoma of skin     Inguinal hernia     S/P appendectomy     Past Surgical History:   Procedure Laterality Date     APPENDECTOMY       ESOPHAGOSCOPY, GASTROSCOPY, DUODENOSCOPY (EGD), COMBINED N/A 6/6/2017    Procedure: COMBINED ESOPHAGOSCOPY, GASTROSCOPY, DUODENOSCOPY (EGD), BIOPSY SINGLE OR MULTIPLE;  gastroscopy;  Surgeon: Jefferson Palmer MD;  Location:  GI     HERNIA REPAIR  1982, 2003, 2015    [facility]      MOHS MICROGRAPHIC PROCEDURE      right scalp;  "basal cell carcinoma       Social History     Tobacco Use     Smoking status: Never Smoker     Smokeless tobacco: Never Used   Substance Use Topics     Alcohol use: Yes     Alcohol/week: 2.4 oz     Types: 4 Cans of beer per week     Comment: a few beers once or twice per week     Family History   Problem Relation Age of Onset     Substance Abuse Mother      Other Cancer Father         kidney cancer     Substance Abuse Father      Cancer Father         Kidney cancer     Depression Son      Depression Son          from suicide         Current Outpatient Medications   Medication Sig Dispense Refill     Cyanocobalamin (VITAMIN B 12 PO) Take by mouth once a week       Glucosamine HCl-MSM (MSM GLUCOSAMINE PO)        magnesium citrate solution Take 296 mLs by mouth every other day       minocycline (MINOCIN/DYNACIN) 50 MG capsule Take 50 mg by mouth       Probiotic Product (PROBIOMAX DAILY DF) CAPS Take by mouth every other day       VITAMIN D, CHOLECALCIFEROL, PO Take by mouth daily       omeprazole (PRILOSEC) 10 MG CR capsule Take by mouth 30-60 minutes before a meal. (Patient taking differently: 20 mg daily Take by mouth 30-60 minutes before a meal.) 90 capsule 0     Allergies   Allergen Reactions     No Known Allergies          Review of Systems  Constitutional, HEENT, cardiovascular, pulmonary, gi and gu systems are negative, except as otherwise noted.    OBJECTIVE:   /60 (BP Location: Right arm, Cuff Size: Adult Regular)   Pulse 68   Temp 97.5  F (36.4  C) (Oral)   Ht 1.867 m (6' 1.5\")   Wt 72.3 kg (159 lb 4.8 oz)   SpO2 99%   BMI 20.73 kg/m   Estimated body mass index is 20.73 kg/m  as calculated from the following:    Height as of this encounter: 1.867 m (6' 1.5\").    Weight as of this encounter: 72.3 kg (159 lb 4.8 oz).  Physical Exam  GENERAL: healthy, alert and no distress  EYES: Eyes grossly normal to inspection, PERRL and conjunctivae and sclerae normal  HENT: ear canals and TM's normal, nose " "and mouth without ulcers or lesions  NECK: no adenopathy, no asymmetry, masses, or scars and thyroid normal to palpation  RESP: lungs clear to auscultation - no rales, rhonchi or wheezes  CV: regular rate and rhythm, normal S1 S2, no S3 or S4, no murmur, click or rub, no peripheral edema and peripheral pulses strong  ABDOMEN: soft, nontender, no hepatosplenomegaly, no masses and bowel sounds normal  RECTAL: normal sphincter tone, no rectal masses, prostate normal size, smooth, nontender without nodules or masses  :  Unchanged right inguinal bulge  MS: no gross musculoskeletal defects noted, no edema  SKIN: no suspicious lesions or rashes  NEURO: Normal strength and tone, mentation intact and speech normal  PSYCH: mentation appears normal, affect normal/bright    [facility]     ASSESSMENT / PLAN:   1. Routine general medical examination at a health care facility  Stable exam    2. Gastroesophageal reflux disease without esophagitis  Symptoms well controlled on PPI therapy       End of Life Planning:  Patient currently has an advanced directive: Yes.  Practitioner is supportive of decision.    COUNSELING:  Reviewed preventive health counseling, as reflected in patient instructions  Special attention given to:       Consider AAA screening for ages 65-75 and smoking history; [facility]       Regular exercise       Healthy diet/nutrition       Immunizations    He had Shingrix at the [facility]             Hepatitis C screening; [facility]       Consider lung cancer screening for ages 55-80 years and 30 pack-year smoking history ; never smoker        Colon cancer screening; repeat in 2014       Prostate cancer screening; PSA [facility] not offered [facility], and patient does not want additional PSA screening     Estimated body mass index is 20.73 kg/m  as calculated from the following:    Height as of this encounter: 1.867 m (6' 1.5\").    Weight as of this " encounter: 72.3 kg (159 lb 4.8 oz).         reports that he has never smoked. He has never used smokeless tobacco.      Appropriate preventive services were discussed with this patient, including applicable screening as appropriate for cardiovascular disease, diabetes, osteopenia/osteoporosis, and glaucoma.  As appropriate for age/gender, discussed screening for colorectal cancer, prostate cancer, breast cancer, and cervical cancer. Checklist reviewing preventive services available has been given to the patient.    Reviewed patients plan of care and provided an AVS. The Basic Care Plan (routine screening as documented in Health Maintenance) for Urbano meets the Care Plan requirement. This Care Plan has been established and reviewed with the Patient.    Counseling Resources:  ATP IV Guidelines  Pooled Cohorts Equation Calculator  Breast Cancer Risk Calculator  FRAX Risk Assessment  ICSI Preventive Guidelines  Dietary Guidelines for Americans, 2010  USDA's MyPlate  ASA Prophylaxis  Lung CA Screening    Andrea Cruz MD  [facility]    Identified Health Risks:

## 2019-08-09 DIAGNOSIS — K21.9 GASTROESOPHAGEAL REFLUX DISEASE WITHOUT ESOPHAGITIS: ICD-10-CM

## 2019-08-09 NOTE — TELEPHONE ENCOUNTER
Dr. Cruz,  Routing refill request to provider for review/approval because:  A break in medication  Last Rx 9/24/18 for #90.    Thanks,  Nani Joseph RN

## 2019-08-09 NOTE — TELEPHONE ENCOUNTER
"omeprazole (PRILOSEC) 10 MG CR capsule  Last Written Prescription Date:  9/24/18  Last Fill Quantity: 90 capsule,  # refills: 0   Last office visit: 7/31/2019 with prescribing provider:  Nancy   Future Office Visit:      Request is for 20MG cap, last RX was for 10MG cap    Requested Prescriptions   Pending Prescriptions Disp Refills     omeprazole (PRILOSEC) 20 MG DR capsule [Pharmacy Med Name: Omeprazole Oral Capsule Delayed Release 20 MG] 90 capsule 2     Sig: TAKE ONE CAPSULE BY MOUTH ONE TIME DAILY       PPI Protocol Passed - 8/9/2019 10:14 AM        Passed - Not on Clopidogrel (unless Pantoprazole ordered)        Passed - No diagnosis of osteoporosis on record        Passed - Recent (12 mo) or future (30 days) visit within the authorizing provider's specialty     Patient had office visit in the last 12 months or has a visit in the next 30 days with authorizing provider or within the authorizing provider's specialty.  See \"Patient Info\" tab in inbasket, or \"Choose Columns\" in Meds & Orders section of the refill encounter.              Passed - Medication is active on med list        Passed - Patient is age 18 or older          "

## 2019-08-12 ENCOUNTER — HOSPITAL ENCOUNTER (EMERGENCY)
Facility: CLINIC | Age: 69
Discharge: HOME | End: 2019-08-13
Attending: EMERGENCY MEDICINE | Admitting: EMERGENCY MEDICINE
Payer: COMMERCIAL

## 2019-08-12 VITALS
RESPIRATION RATE: 16 BRPM | TEMPERATURE: 97.3 F | DIASTOLIC BLOOD PRESSURE: 53 MMHG | HEIGHT: 74 IN | SYSTOLIC BLOOD PRESSURE: 129 MMHG | WEIGHT: 159 LBS | BODY MASS INDEX: 20.41 KG/M2 | OXYGEN SATURATION: 100 %

## 2019-08-12 DIAGNOSIS — R07.9 CHEST PAIN, UNSPECIFIED TYPE: ICD-10-CM

## 2019-08-12 LAB — INTERPRETATION ECG - MUSE: NORMAL

## 2019-08-12 PROCEDURE — 99285 EMERGENCY DEPT VISIT HI MDM: CPT | Mod: 25

## 2019-08-12 PROCEDURE — 93005 ELECTROCARDIOGRAM TRACING: CPT

## 2019-08-12 ASSESSMENT — MIFFLIN-ST. JEOR: SCORE: 1545.97

## 2019-08-12 NOTE — ED AVS SNAPSHOT
Emergency Department  [address] [place] [address]  Phone:  [phone]  Fax:  [phone]                                    Urbano Magallanes   MRN: [id number]    Department:   Emergency Department   Date of Visit:  8/12/2019           After Visit Summary Signature Page    I have received my discharge instructions, and my questions have been answered. I have discussed any challenges I see with this plan with the nurse or doctor.    ..........................................................................................................................................  Patient/Patient Representative Signature      ..........................................................................................................................................  Patient Representative Print Name and Relationship to Patient    ..................................................               ................................................  Date                                   Time    ..........................................................................................................................................  Reviewed by Signature/Title    ...................................................              ..............................................  Date                                               Time          22EPIC Rev 08/18

## 2019-08-13 ENCOUNTER — APPOINTMENT (OUTPATIENT)
Dept: GENERAL RADIOLOGY | Facility: CLINIC | Age: 69
End: 2019-08-13
Attending: EMERGENCY MEDICINE
Payer: COMMERCIAL

## 2019-08-13 LAB
ALBUMIN SERPL-MCNC: 3.4 G/DL (ref 3.4–5)
ALP SERPL-CCNC: 62 U/L (ref 40–150)
ALT SERPL W P-5'-P-CCNC: 31 U/L (ref 0–70)
ANION GAP SERPL CALCULATED.3IONS-SCNC: 7 MMOL/L (ref 3–14)
AST SERPL W P-5'-P-CCNC: 21 U/L (ref 0–45)
BASOPHILS # BLD AUTO: 0 10E9/L (ref 0–0.2)
BASOPHILS NFR BLD AUTO: 0.4 %
BILIRUB SERPL-MCNC: 0.4 MG/DL (ref 0.2–1.3)
BUN SERPL-MCNC: 17 MG/DL (ref 7–30)
CALCIUM SERPL-MCNC: 8.2 MG/DL (ref 8.5–10.1)
CHLORIDE SERPL-SCNC: 111 MMOL/L (ref 94–109)
CO2 SERPL-SCNC: 27 MMOL/L (ref 20–32)
CREAT SERPL-MCNC: 0.98 MG/DL (ref 0.66–1.25)
DIFFERENTIAL METHOD BLD: ABNORMAL
EOSINOPHIL # BLD AUTO: 0.1 10E9/L (ref 0–0.7)
EOSINOPHIL NFR BLD AUTO: 1.7 %
ERYTHROCYTE [DISTWIDTH] IN BLOOD BY AUTOMATED COUNT: 12.5 % (ref 10–15)
GFR SERPL CREATININE-BSD FRML MDRD: 78 ML/MIN/{1.73_M2}
GLUCOSE SERPL-MCNC: 88 MG/DL (ref 70–99)
HCT VFR BLD AUTO: 39.5 % (ref 40–53)
HGB BLD-MCNC: 13.3 G/DL (ref 13.3–17.7)
IMM GRANULOCYTES # BLD: 0 10E9/L (ref 0–0.4)
IMM GRANULOCYTES NFR BLD: 0 %
LIPASE SERPL-CCNC: 143 U/L (ref 73–393)
LYMPHOCYTES # BLD AUTO: 1.5 10E9/L (ref 0.8–5.3)
LYMPHOCYTES NFR BLD AUTO: 27.1 %
MCH RBC QN AUTO: 31 PG (ref 26.5–33)
MCHC RBC AUTO-ENTMCNC: 33.7 G/DL (ref 31.5–36.5)
MCV RBC AUTO: 92 FL (ref 78–100)
MONOCYTES # BLD AUTO: 0.5 10E9/L (ref 0–1.3)
MONOCYTES NFR BLD AUTO: 9.5 %
NEUTROPHILS # BLD AUTO: 3.3 10E9/L (ref 1.6–8.3)
NEUTROPHILS NFR BLD AUTO: 61.3 %
NRBC # BLD AUTO: 0 10*3/UL
NRBC BLD AUTO-RTO: 0 /100
PLATELET # BLD AUTO: 197 10E9/L (ref 150–450)
POTASSIUM SERPL-SCNC: 3.6 MMOL/L (ref 3.4–5.3)
PROT SERPL-MCNC: 6.2 G/DL (ref 6.8–8.8)
RBC # BLD AUTO: 4.29 10E12/L (ref 4.4–5.9)
SODIUM SERPL-SCNC: 145 MMOL/L (ref 133–144)
TROPONIN I SERPL-MCNC: <0.015 UG/L (ref 0–0.04)
WBC # BLD AUTO: 5.4 10E9/L (ref 4–11)

## 2019-08-13 PROCEDURE — 71046 X-RAY EXAM CHEST 2 VIEWS: CPT

## 2019-08-13 PROCEDURE — 85025 COMPLETE CBC W/AUTO DIFF WBC: CPT | Performed by: EMERGENCY MEDICINE

## 2019-08-13 PROCEDURE — 83690 ASSAY OF LIPASE: CPT | Performed by: EMERGENCY MEDICINE

## 2019-08-13 PROCEDURE — 84484 ASSAY OF TROPONIN QUANT: CPT | Performed by: EMERGENCY MEDICINE

## 2019-08-13 PROCEDURE — 80053 COMPREHEN METABOLIC PANEL: CPT | Performed by: EMERGENCY MEDICINE

## 2019-08-13 ASSESSMENT — ENCOUNTER SYMPTOMS
LIGHT-HEADEDNESS: 0
NAUSEA: 0
SHORTNESS OF BREATH: 0

## 2019-08-13 NOTE — ED PROVIDER NOTES
History     Chief Complaint:  Chest Pain     The history is provided by the patient and the spouse.      Urbano Magallanes is a 69 year old male with a history of GERD and skin cancer who presents to the emergency department with his wife for evaluation of chest pain. About 7 hours prior to arrival, the patient states he had the sudden onset of a sharp, stabbing pain in his chest. The episodes would not last very long but would return about 2 minutes after the previous one dissipated. He reports that the pain is located in the center of his chest, does not radiate anywhere else, and goes from a 0/10 at baseline to a 3-4/10 when the intermittent episodes come. To note, the patient has acid reflux, but states this does not feel like his regular acid reflux. He tried taking two full strength aspirin about two hours prior to arrival but did not try taking any Tums. He also reports that he is fairly active and has not had any chest pain with physical activity. To note, about 7-8 years ago, he had similar symptoms to this with an abnormal ECG, so he had a stress test done but came back normal. His abnormal chest pain prompted the patient to seek evaluation in the emergency room today.    Patient denies any shortness of breath, nausea, lightheadedness, or feeling like he is going to pass out. To note, the patient's wife states that she recently got a blood pressure monitor and had her  take his blood pressure. He did it three times and the monitor detected something that told him to take it again.     Cardiac/PE/DVT Risk Factors:  The patient has no history of hypertension, hyperlipidemia, diabetes, denies smoking. The patient denies any personal or familial history of PE, DVT, or clotting disorder. The patient reports no recent travel, surgery, or other immobilizations.     Allergies:  No Known Drug Allergies    Medications:    Minocin  Prilosec  Probiotic    Past Medical History:    Basal cell carcinoma  Dysplastic  "nevus  GERD  Actinic keratosis  Depression  Hernia  Tinnitus     Past Surgical History:    Appendectomy  EGD, combined  Hernia repair  Mohs micrographic procedure    Family History:    Substance abuse  Kidney cancer  Depression     Social History:  Negative for tobacco use.  Positive for alcohol use.  Negative for drug use.  Presents with his wife.  Marital Status:        Review of Systems   Respiratory: Negative for shortness of breath.    Cardiovascular: Positive for chest pain.   Gastrointestinal: Negative for nausea.   Neurological: Negative for syncope and light-headedness.   All other systems reviewed and are negative.      Physical Exam     Patient Vitals for the past 24 hrs:   BP Temp Temp src Heart Rate Resp SpO2 Height Weight   08/12/19 2142 129/53 97.3  F (36.3  C) Temporal 51 16 100 % 1.88 m (6' 2\") 72.1 kg (159 lb)     Physical Exam  Eye:  Pupils are equal, round, and reactive.  Extraocular movements intact.    ENT:  No rhinorrhea.  Moist mucus membranes.  Normal tongue and tonsil.    Cardiac:  Regular rate and rhythm.  No murmurs, gallops, or rubs.    Pulmonary:  Clear to auscultation bilaterally.  No wheezes, rales, or rhonchi.    Abdomen:  Positive bowel sounds.  Abdomen is soft and non-distended, without focal tenderness.    Musculoskeletal:  Normal movement of all extremities without evidence for deficit. No lower extremity edema or asymmetry. Patient points to his inferior sternum as source of pain though palpation does not exacerbate his symptoms.     Skin:  Warm and dry without rashes.    Neurologic:  Non-focal exam without asymmetric weakness or numbness.     Psychiatric:  Normal affect with appropriate interaction with examiner.    Emergency Department Course   ECG:  Indication: Chest Pain  Time: 2148  Vent. Rate 55 bpm. AK interval 134. QRS duration 98. QT/QTc 424/405. P-R-T axis 82 81 58. Sinus bradycardia. Read time: 2330    Imaging:  XR Chest, G/E 2 views:   Mild hyperaeration of " the lungs. No focal air-space disease or other acute findings. Normal heart size. As per radiology.     Laboratory:  CBC: WBC: 5.4, HGB: 13.3, PLT: 197  CMP: Sodium: 145 (H), Chloride: 111 (H), Calcium: 8.2 (L), Protein Total: 6.2 (L), o/w WNL (Creatinine: 0.98)    Lipase: 143    0015 Troponin: <0.015    Emergency Department Course:  Nursing notes and vitals reviewed. 2350 I performed an exam of the patient as documented above.     Blood drawn. This was sent to the lab for further testing, results above.    EKG obtained in the ED, see results above.     The patient was sent for a chest x-ray while in the emergency department, findings above.     0055 I rechecked the patient and discussed the results of his workup thus far.     Findings and plan explained to the Patient and spouse. Patient discharged home with instructions regarding supportive care, medications, and reasons to return. The importance of close follow-up was reviewed.    I personally reviewed the laboratory results with the Patient and spouse and answered all related questions prior to discharge.     Impression & Plan    Medical Decision Making:  This delightful 69-year-old without significant medical problems presents to us with concerns of having intermittent chest pain for the last 7 to 8 hours.  He describes having a very short lasting sharp sensation to the bottom of his sternum that lasts a few seconds and then completely dissipates.  It will then come back every 2 to 3 minutes.  He denies having any associated symptoms of shortness of breath, lightheadedness, or nausea.  He denies there being any exertional component to this.  He notes that he works out at the [employer] 3-4 times a week and has had no issues doing that over the past week.  He denies having similar symptoms to this in the past.    On my exam, the patient appears exceedingly well.  He has normal vital signs.  He is having no complaints at the time of my assessment.  Head to toe exam is  unremarkable.  Laboratory investigation is unremarkable including a negative troponin.  Chest x-ray is clear.  I considered abdominal causes of this, though his lipase and liver function tests are otherwise normal.  He denies there being a GI component to this.  I did consider pulmonary embolism, though this would seem highly unlikely in a gentleman who is bradycardic, showing an oxygenation of 100%, having no risk factors for this disease process, and having no lower extremity edema.  I also considered aortic dissection though this also seems equally unlikely considering the history and physical.    At this juncture, we spoke at length of how to proceed.  I explained that his work-up is generally unremarkable at this time and that it would be unlikely to represent cardiac etiologies.  We discussed his low HEART score.  I did offer to set him up with an outpatient stress test.  However, he prefers to follow-up with his primary doctor to discuss this further at his he does not want to undergo this testing unless it is absolutely necessary.  I support this, though I was clear that there is some diagnostic uncertainty and I am concerned with his chest pain in this age group.  I advised him to call his doctor tomorrow for a very close recheck with no hesitation to return to the ER if he develops worsening of his symptoms or if he changes his mind about being admitted for a more thorough investigation.    Diagnosis:    ICD-10-CM    1. Chest pain, unspecified type R07.9        Disposition:  discharged to home with his wife.    Scribe Disclosure:  I, Sanaz Peterson, am serving as a scribe on 8/13/2019 at 12:00 AM to personally document services performed by Trierweiler, Chad A, MD based on my observations and the provider's statements to me.     Sanaz Peterson  8/12/2019    EMERGENCY DEPARTMENT       Trierweiler, Chad A, MD  08/13/19 0603

## 2019-08-14 ENCOUNTER — DOCUMENTATION ONLY (OUTPATIENT)
Dept: OTHER | Facility: CLINIC | Age: 69
End: 2019-08-14

## 2019-08-16 ENCOUNTER — OFFICE VISIT (OUTPATIENT)
Dept: FAMILY MEDICINE | Facility: CLINIC | Age: 69
End: 2019-08-16
Payer: COMMERCIAL

## 2019-08-16 VITALS
HEIGHT: 74 IN | OXYGEN SATURATION: 97 % | BODY MASS INDEX: 20.95 KG/M2 | TEMPERATURE: 97.5 F | WEIGHT: 163.2 LBS | SYSTOLIC BLOOD PRESSURE: 99 MMHG | DIASTOLIC BLOOD PRESSURE: 66 MMHG | HEART RATE: 71 BPM

## 2019-08-16 DIAGNOSIS — R07.9 CHEST PAIN, UNSPECIFIED TYPE: Primary | ICD-10-CM

## 2019-08-16 PROCEDURE — 99214 OFFICE O/P EST MOD 30 MIN: CPT | Performed by: INTERNAL MEDICINE

## 2019-08-16 ASSESSMENT — PATIENT HEALTH QUESTIONNAIRE - PHQ9: SUM OF ALL RESPONSES TO PHQ QUESTIONS 1-9: 4

## 2019-08-16 ASSESSMENT — MIFFLIN-ST. JEOR: SCORE: 1565.02

## 2019-08-16 NOTE — PROGRESS NOTES
Subjective     Urbano Magallanes is a 69 year old male who presents to clinic today for the following health issues:    HPI   ED/UC Followup:    Facility:   ED  Date of visit: 8-  Reason for visit: Chest Pain  Current Status: Feeling better       This is a very pleasant 69-year-old man who presented to the emergency department with chest pain.  He believes that his chest pain is probably related to gastroesophageal reflux disease or may be from stress because his wife has been ill with gallstone pancreatitis.  He has a known history of gastroesophageal reflux disease.  He is not a smoker.  He describes substernal pressure that resolves when he was in the emergency department.  The pain was moderate in severity.  The pain did not modulate with exercise.  Cardiac enzyme was negative.  EKG was negative.  Chest x-ray was negative.  He has not had a recurrence of pain since emergency department discharge.  He denies dysphagia, odynophagia or weight loss.    Patient Active Problem List   Diagnosis     Right inguinal pain     Gastroesophageal reflux disease without esophagitis     Actinic keratosis     Backache     Depressive disorder     Elevated PSA     Hearing loss     History of basal cell carcinoma     History of disease of skin and subcutaneous tissue     History of dysplastic nevus     History of squamous cell carcinoma of skin     Inguinal hernia     S/P appendectomy     Past Surgical History:   Procedure Laterality Date     APPENDECTOMY       ESOPHAGOSCOPY, GASTROSCOPY, DUODENOSCOPY (EGD), COMBINED N/A 6/6/2017    Procedure: COMBINED ESOPHAGOSCOPY, GASTROSCOPY, DUODENOSCOPY (EGD), BIOPSY SINGLE OR MULTIPLE;  gastroscopy;  Surgeon: Jefferson Palmer MD;  Location:  GI     HERNIA REPAIR  1982, 2003, 2015    [facility]      MOHS MICROGRAPHIC PROCEDURE      right scalp; basal cell carcinoma       Social History     Tobacco Use     Smoking status: Never Smoker     Smokeless tobacco: Never Used   Substance Use Topics      Alcohol use: Yes     Alcohol/week: 2.4 oz     Types: 4 Cans of beer per week     Comment: a few beers once or twice per week     Family History   Problem Relation Age of Onset     Substance Abuse Mother      Other Cancer Father         kidney cancer     Substance Abuse Father      Cancer Father         Kidney cancer     Depression Son      Depression Son          from suicide         Current Outpatient Medications   Medication Sig Dispense Refill     omeprazole (PRILOSEC) 20 MG DR capsule TAKE ONE CAPSULE BY MOUTH ONE TIME DAILY  90 capsule 2     Probiotic Product (PROBIOMAX DAILY DF) CAPS Take by mouth every other day       VITAMIN D, CHOLECALCIFEROL, PO Take 5,000 Units by mouth daily        Cyanocobalamin (VITAMIN B 12 PO) Take by mouth once a week       Glucosamine HCl-MSM (MSM GLUCOSAMINE PO)        magnesium citrate solution Take 296 mLs by mouth every other day       minocycline (MINOCIN/DYNACIN) 50 MG capsule Take 50 mg by mouth twice a week        Allergies   Allergen Reactions     No Known Allergies          Reviewed and updated as needed this visit by Provider         Review of Systems   ROS COMP: Constitutional, HEENT, cardiovascular, pulmonary, gi and gu systems are negative, except as otherwise noted.      Objective    There were no vitals taken for this visit.  There is no height or weight on file to calculate BMI.  Physical Exam   GENERAL: healthy, alert and no distress  NECK: no adenopathy, no asymmetry, masses, or scars and thyroid normal to palpation  RESP: lungs clear to auscultation - no rales, rhonchi or wheezes  CV: regular rate and rhythm, normal S1 S2, no S3 or S4, no murmur, click or rub, no peripheral edema and peripheral pulses strong  ABDOMEN: soft, nontender, no hepatosplenomegaly, no masses and bowel sounds normal  MS: no gross musculoskeletal defects noted, no edema  NEURO: Normal strength and tone, mentation intact and speech normal  PSYCH: mentation appears normal, affect  normal/bright    Diagnostic Test Results:  Labs reviewed in Epic  Results for orders placed or performed during the hospital encounter of 08/12/19   XR Chest 2 Views    Narrative    XR CHEST 2 VW  8/13/2019 12:25 AM     INDICATION: Chest pain.    COMPARISON: None.      Impression    IMPRESSION: Mild hyperaeration of the lungs. No focal air-space  disease or other acute findings. Normal heart size.    MARYCARMEN MCDONOUGH MD   CBC with platelets differential   Result Value Ref Range    WBC 5.4 4.0 - 11.0 10e9/L    RBC Count 4.29 (L) 4.4 - 5.9 10e12/L    Hemoglobin 13.3 13.3 - 17.7 g/dL    Hematocrit 39.5 (L) 40.0 - 53.0 %    MCV 92 78 - 100 fl    MCH 31.0 26.5 - 33.0 pg    MCHC 33.7 31.5 - 36.5 g/dL    RDW 12.5 10.0 - 15.0 %    Platelet Count 197 150 - 450 10e9/L    Diff Method Automated Method     % Neutrophils 61.3 %    % Lymphocytes 27.1 %    % Monocytes 9.5 %    % Eosinophils 1.7 %    % Basophils 0.4 %    % Immature Granulocytes 0.0 %    Nucleated RBCs 0 0 /100    Absolute Neutrophil 3.3 1.6 - 8.3 10e9/L    Absolute Lymphocytes 1.5 0.8 - 5.3 10e9/L    Absolute Monocytes 0.5 0.0 - 1.3 10e9/L    Absolute Eosinophils 0.1 0.0 - 0.7 10e9/L    Absolute Basophils 0.0 0.0 - 0.2 10e9/L    Abs Immature Granulocytes 0.0 0 - 0.4 10e9/L    Absolute Nucleated RBC 0.0    Troponin I   Result Value Ref Range    Troponin I ES <0.015 0.000 - 0.045 ug/L   Comprehensive metabolic panel   Result Value Ref Range    Sodium 145 (H) 133 - 144 mmol/L    Potassium 3.6 3.4 - 5.3 mmol/L    Chloride 111 (H) 94 - 109 mmol/L    Carbon Dioxide 27 20 - 32 mmol/L    Anion Gap 7 3 - 14 mmol/L    Glucose 88 70 - 99 mg/dL    Urea Nitrogen 17 7 - 30 mg/dL    Creatinine 0.98 0.66 - 1.25 mg/dL    GFR Estimate 78 >60 mL/min/[1.73_m2]    GFR Estimate If Black 90 >60 mL/min/[1.73_m2]    Calcium 8.2 (L) 8.5 - 10.1 mg/dL    Bilirubin Total 0.4 0.2 - 1.3 mg/dL    Albumin 3.4 3.4 - 5.0 g/dL    Protein Total 6.2 (L) 6.8 - 8.8 g/dL    Alkaline Phosphatase 62 40 -  150 U/L    ALT 31 0 - 70 U/L    AST 21 0 - 45 U/L   Lipase   Result Value Ref Range    Lipase 143 73 - 393 U/L   EKG 12-lead, tracing only   Result Value Ref Range    Interpretation ECG Click View Image link to view waveform and result            Assessment & Plan     1. Chest pain, unspecified type  Probable gastrointestinal etiology although demographic risk factors are present for ischemic pain.  I recommended a provocative stress test to evaluate further.  If that is negative, he will continue take his omeprazole as directed to manage GERD symptoms.  Consider repeat endoscopy if symptoms are persistent.  Discussed with patient.  - Echocardiogram Exercise Stress; Future           Return in about 1 year (around 8/1/2020) for Preventive Visit.    Andrea Cruz MD  [facility]

## 2019-08-21 ENCOUNTER — HOSPITAL ENCOUNTER (OUTPATIENT)
Dept: CARDIOLOGY | Facility: CLINIC | Age: 69
Discharge: HOME | End: 2019-08-21
Attending: INTERNAL MEDICINE | Admitting: INTERNAL MEDICINE
Payer: COMMERCIAL

## 2019-08-21 DIAGNOSIS — R07.9 CHEST PAIN, UNSPECIFIED TYPE: ICD-10-CM

## 2019-08-21 PROCEDURE — 93018 CV STRESS TEST I&R ONLY: CPT | Performed by: INTERNAL MEDICINE

## 2019-08-21 PROCEDURE — 93350 STRESS TTE ONLY: CPT | Mod: 26 | Performed by: INTERNAL MEDICINE

## 2019-08-21 PROCEDURE — 93016 CV STRESS TEST SUPVJ ONLY: CPT | Performed by: INTERNAL MEDICINE

## 2019-08-21 PROCEDURE — 40000264 ECHO STRESS ECHOCARDIOGRAM

## 2019-08-21 PROCEDURE — 93325 DOPPLER ECHO COLOR FLOW MAPG: CPT | Mod: 26 | Performed by: INTERNAL MEDICINE

## 2019-08-21 PROCEDURE — 25500064 ZZH RX 255 OP 636: Performed by: INTERNAL MEDICINE

## 2019-08-21 PROCEDURE — 93321 DOPPLER ECHO F-UP/LMTD STD: CPT | Mod: 26 | Performed by: INTERNAL MEDICINE

## 2019-08-21 RX ADMIN — HUMAN ALBUMIN MICROSPHERES AND PERFLUTREN 9 ML: 10; .22 INJECTION, SOLUTION INTRAVENOUS at 09:49

## 2019-08-21 NOTE — LETTER
[facility]  [address] [facility]  [address]  [place]  [address]  Tel: [phone]    August 26, 2019    Urbano Magallanes  [address] [facility] [address]        Dear Mr. Howelllisa,    The following letter pertains to your most recent diagnostic tests:    This stress echocardiogram test result is reassuring.  It suggests that your chest pain was NOT from heart artery disease ('angina').  The result also suggests lower risk for cardiac events in the future.        Bottom line:  I suspect your chest pain was from gastroesophageal reflux as we discussed.        Follow up:  Return to clinic if symptoms reoccur or new symptoms develop.        Sincerely,    Dr. Cruz/AUBREE

## 2019-08-24 NOTE — RESULT ENCOUNTER NOTE
The following letter pertains to your most recent diagnostic tests:    This stress echocardiogram test result is reassuring.  It suggests that your chest pain was NOT from heart artery disease ('angina').  The result also suggests lower risk for cardiac events in the future.        Bottom line:  I suspect your chest pain was from gastroesophageal reflux as we discussed.        Follow up:  Return to clinic if symptoms reoccur or new symptoms develop.        Sincerely,    Dr. Cruz

## 2019-11-07 ENCOUNTER — HEALTH MAINTENANCE LETTER (OUTPATIENT)
Age: 69
End: 2019-11-07

## 2020-04-10 DIAGNOSIS — K21.9 GASTROESOPHAGEAL REFLUX DISEASE WITHOUT ESOPHAGITIS: ICD-10-CM

## 2020-04-10 NOTE — TELEPHONE ENCOUNTER
Prescription approved per [facility] [facility].  Jaimee STARKEY RN

## 2020-04-10 NOTE — TELEPHONE ENCOUNTER
"Pending Prescriptions:                       Disp   Refills    omeprazole (PRILOSEC) 20 MG DR capsule [P*90 cap*0            Sig: TAKE ONE CAPSULE BY MOUTH ONCE DAILY    Last Written Prescription Date:  08/09/2019  Last Fill Quantity: 90,  # refills: 2   Last office visit: 8/16/2019 with prescribing provider:     Future Office Visit:    Requested Prescriptions   Pending Prescriptions Disp Refills     omeprazole (PRILOSEC) 20 MG DR capsule [Pharmacy Med Name: Omeprazole Oral Capsule Delayed Release 20 MG] 90 capsule 0     Sig: TAKE ONE CAPSULE BY MOUTH ONCE DAILY       PPI Protocol Passed - 4/10/2020  2:37 PM        Passed - Not on Clopidogrel (unless Pantoprazole ordered)        Passed - No diagnosis of osteoporosis on record        Passed - Recent (12 mo) or future (30 days) visit within the authorizing provider's specialty     Patient has had an office visit with the authorizing provider or a provider within the authorizing providers department within the previous 12 mos or has a future within next 30 days. See \"Patient Info\" tab in inbasket, or \"Choose Columns\" in Meds & Orders section of the refill encounter.              Passed - Medication is active on med list        Passed - Patient is age 18 or older             "

## 2020-07-23 DIAGNOSIS — K21.9 GASTROESOPHAGEAL REFLUX DISEASE WITHOUT ESOPHAGITIS: ICD-10-CM

## 2020-08-31 ENCOUNTER — APPOINTMENT (OUTPATIENT)
Dept: CT IMAGING | Facility: CLINIC | Age: 70
End: 2020-08-31
Attending: EMERGENCY MEDICINE
Payer: COMMERCIAL

## 2020-08-31 ENCOUNTER — HOSPITAL ENCOUNTER (EMERGENCY)
Facility: CLINIC | Age: 70
Discharge: HOME | End: 2020-09-01
Attending: EMERGENCY MEDICINE | Admitting: EMERGENCY MEDICINE
Payer: COMMERCIAL

## 2020-08-31 VITALS
HEART RATE: 67 BPM | OXYGEN SATURATION: 98 % | DIASTOLIC BLOOD PRESSURE: 73 MMHG | SYSTOLIC BLOOD PRESSURE: 130 MMHG | TEMPERATURE: 97.4 F | RESPIRATION RATE: 16 BRPM

## 2020-08-31 DIAGNOSIS — J04.30 SUPRAGLOTTITIS WITHOUT AIRWAY OBSTRUCTION: ICD-10-CM

## 2020-08-31 LAB
ALBUMIN SERPL-MCNC: 4.5 G/DL (ref 3.4–5)
ALP SERPL-CCNC: 90 U/L (ref 40–150)
ALT SERPL W P-5'-P-CCNC: 37 U/L (ref 0–70)
ANION GAP SERPL CALCULATED.3IONS-SCNC: 4 MMOL/L (ref 3–14)
AST SERPL W P-5'-P-CCNC: 22 U/L (ref 0–45)
BASOPHILS # BLD AUTO: 0 10E9/L (ref 0–0.2)
BASOPHILS NFR BLD AUTO: 0.5 %
BILIRUB SERPL-MCNC: 0.7 MG/DL (ref 0.2–1.3)
BUN SERPL-MCNC: 18 MG/DL (ref 7–30)
CALCIUM SERPL-MCNC: 8.9 MG/DL (ref 8.5–10.1)
CHLORIDE SERPL-SCNC: 105 MMOL/L (ref 94–109)
CO2 SERPL-SCNC: 29 MMOL/L (ref 20–32)
CREAT SERPL-MCNC: 0.99 MG/DL (ref 0.66–1.25)
DIFFERENTIAL METHOD BLD: NORMAL
EOSINOPHIL # BLD AUTO: 0.1 10E9/L (ref 0–0.7)
EOSINOPHIL NFR BLD AUTO: 1.1 %
ERYTHROCYTE [DISTWIDTH] IN BLOOD BY AUTOMATED COUNT: 12.1 % (ref 10–15)
GFR SERPL CREATININE-BSD FRML MDRD: 76 ML/MIN/{1.73_M2}
GLUCOSE SERPL-MCNC: 88 MG/DL (ref 70–99)
HCT VFR BLD AUTO: 49.3 % (ref 40–53)
HGB BLD-MCNC: 16.5 G/DL (ref 13.3–17.7)
IMM GRANULOCYTES # BLD: 0 10E9/L (ref 0–0.4)
IMM GRANULOCYTES NFR BLD: 0.2 %
INTERPRETATION ECG - MUSE: NORMAL
LIPASE SERPL-CCNC: 146 U/L (ref 73–393)
LYMPHOCYTES # BLD AUTO: 2.1 10E9/L (ref 0.8–5.3)
LYMPHOCYTES NFR BLD AUTO: 33 %
MCH RBC QN AUTO: 31 PG (ref 26.5–33)
MCHC RBC AUTO-ENTMCNC: 33.5 G/DL (ref 31.5–36.5)
MCV RBC AUTO: 93 FL (ref 78–100)
MONOCYTES # BLD AUTO: 0.7 10E9/L (ref 0–1.3)
MONOCYTES NFR BLD AUTO: 10.6 %
NEUTROPHILS # BLD AUTO: 3.5 10E9/L (ref 1.6–8.3)
NEUTROPHILS NFR BLD AUTO: 54.6 %
NRBC # BLD AUTO: 0 10*3/UL
NRBC BLD AUTO-RTO: 0 /100
PLATELET # BLD AUTO: 254 10E9/L (ref 150–450)
POTASSIUM SERPL-SCNC: 3.9 MMOL/L (ref 3.4–5.3)
PROT SERPL-MCNC: 8.5 G/DL (ref 6.8–8.8)
RBC # BLD AUTO: 5.32 10E12/L (ref 4.4–5.9)
SODIUM SERPL-SCNC: 138 MMOL/L (ref 133–144)
TROPONIN I SERPL-MCNC: <0.015 UG/L (ref 0–0.04)
WBC # BLD AUTO: 6.3 10E9/L (ref 4–11)

## 2020-08-31 PROCEDURE — 93005 ELECTROCARDIOGRAM TRACING: CPT | Mod: 59

## 2020-08-31 PROCEDURE — 70491 CT SOFT TISSUE NECK W/DYE: CPT

## 2020-08-31 PROCEDURE — 84484 ASSAY OF TROPONIN QUANT: CPT | Performed by: EMERGENCY MEDICINE

## 2020-08-31 PROCEDURE — 99285 EMERGENCY DEPT VISIT HI MDM: CPT | Mod: 25

## 2020-08-31 PROCEDURE — 80053 COMPREHEN METABOLIC PANEL: CPT | Performed by: EMERGENCY MEDICINE

## 2020-08-31 PROCEDURE — 83690 ASSAY OF LIPASE: CPT | Performed by: EMERGENCY MEDICINE

## 2020-08-31 PROCEDURE — 25000128 H RX IP 250 OP 636: Performed by: EMERGENCY MEDICINE

## 2020-08-31 PROCEDURE — 31525 DX LARYNGOSCOPY EXCL NB: CPT

## 2020-08-31 PROCEDURE — 25000125 ZZHC RX 250

## 2020-08-31 PROCEDURE — 25000125 ZZHC RX 250: Performed by: EMERGENCY MEDICINE

## 2020-08-31 PROCEDURE — 71260 CT THORAX DX C+: CPT

## 2020-08-31 PROCEDURE — 85025 COMPLETE CBC W/AUTO DIFF WBC: CPT | Performed by: EMERGENCY MEDICINE

## 2020-08-31 PROCEDURE — 25000132 ZZH RX MED GY IP 250 OP 250 PS 637: Performed by: EMERGENCY MEDICINE

## 2020-08-31 RX ORDER — LIDOCAINE HYDROCHLORIDE 40 MG/ML
SOLUTION TOPICAL
Status: COMPLETED
Start: 2020-08-31 | End: 2020-08-31

## 2020-08-31 RX ORDER — IOPAMIDOL 755 MG/ML
125 INJECTION, SOLUTION INTRAVASCULAR ONCE
Status: COMPLETED | OUTPATIENT
Start: 2020-08-31 | End: 2020-08-31

## 2020-08-31 RX ORDER — DEXAMETHASONE SODIUM PHOSPHATE 10 MG/ML
10 INJECTION, SOLUTION INTRAMUSCULAR; INTRAVENOUS ONCE
Status: COMPLETED | OUTPATIENT
Start: 2020-08-31 | End: 2020-08-31

## 2020-08-31 RX ADMIN — SODIUM CHLORIDE 70 ML: 9 INJECTION, SOLUTION INTRAVENOUS at 22:00

## 2020-08-31 RX ADMIN — LIDOCAINE HYDROCHLORIDE: 40 SOLUTION TOPICAL at 23:49

## 2020-08-31 RX ADMIN — IOPAMIDOL 125 ML: 755 INJECTION, SOLUTION INTRAVENOUS at 21:59

## 2020-08-31 RX ADMIN — DEXAMETHASONE SODIUM PHOSPHATE 10 MG: 10 INJECTION, SOLUTION INTRAMUSCULAR; INTRAVENOUS at 23:52

## 2020-08-31 RX ADMIN — AMOXICILLIN AND CLAVULANATE POTASSIUM 1 TABLET: 875; 125 TABLET, FILM COATED ORAL at 23:52

## 2020-08-31 RX ADMIN — LIDOCAINE HYDROCHLORIDE 30 ML: 20 SOLUTION ORAL; TOPICAL at 20:34

## 2020-08-31 ASSESSMENT — ENCOUNTER SYMPTOMS
TROUBLE SWALLOWING: 0
CHEST TIGHTNESS: 0
ABDOMINAL PAIN: 0
CONSTIPATION: 0
SHORTNESS OF BREATH: 0
SORE THROAT: 1
FEVER: 0
LIGHT-HEADEDNESS: 0
DIARRHEA: 0
BLOOD IN STOOL: 0
UNEXPECTED WEIGHT CHANGE: 0
COUGH: 0
MYALGIAS: 0
BACK PAIN: 0

## 2020-08-31 NOTE — ED TRIAGE NOTES
Pt states that he thinks this is acid reflux;  He takes 20mg of omeprazole daily but states that hasn't been working.

## 2020-08-31 NOTE — ED AVS SNAPSHOT
Emergency Department  [address] [place] [address]  Phone:  [phone]  Fax:  [phone]                                    Urbano Magallanes   MRN: [id number]    Department:   Emergency Department   Date of Visit:  8/31/2020           After Visit Summary Signature Page    I have received my discharge instructions, and my questions have been answered. I have discussed any challenges I see with this plan with the nurse or doctor.    ..........................................................................................................................................  Patient/Patient Representative Signature      ..........................................................................................................................................  Patient Representative Print Name and Relationship to Patient    ..................................................               ................................................  Date                                   Time    ..........................................................................................................................................  Reviewed by Signature/Title    ...................................................              ..............................................  Date                                               Time          22EPIC Rev 08/18

## 2020-09-01 NOTE — ED PROVIDER NOTES
History   Chief Complaint:  Throat pain    HPI  Urbano Magallanes is a 70 year old male with a history of GERD who presents with throat pain. He report the onset of what he thinks is acid reflux about 2 weeks ago. He notes that 5 years ago he had a similar, worse pain that omeprazole relieved, but states omeprazole has not been working this time. He states that nothing makes the pain better or worse. He denies shortness of breath, chest pain, or pain with deep breaths. He denies abdominal pain, trouble swallowing, abnormal BM's or dramatic weight loss. He denies cough, fever, or body aches. He denies back pain or lightheadedness. He notes he does not smoke and is a social drinker but has not had a drink in 2 weeks.     Allergies:  No Known Allergies    Medications:    Omeprazole    Past Medical History:    Skin cancer  GERD  Hernia  Tinnitus  Depression    Past Surgical History:    Appendectomy  EGD  Hernia repair  MOHS micrographic procedure    Family History:    Substance abuse  Kidney cancer  Depression     Social History:  Marital Status:   Tobacco use: No  Alcohol use: Yes  Drug use: No    Review of Systems   Constitutional: Negative for fever and unexpected weight change.   HENT: Positive for sore throat. Negative for trouble swallowing.    Respiratory: Negative for cough, chest tightness and shortness of breath.    Cardiovascular: Negative for chest pain.   Gastrointestinal: Negative for abdominal pain, blood in stool, constipation and diarrhea.   Musculoskeletal: Negative for back pain and myalgias.   Neurological: Negative for light-headedness.   All other systems reviewed and are negative.    Physical Exam     Patient Vitals for the past 24 hrs:   BP Temp Temp src Pulse Resp SpO2   08/31/20 2330 130/73 -- -- 67 -- 98 %   08/31/20 2300 126/75 -- -- 67 -- 98 %   08/31/20 2245 133/77 -- -- 67 -- 99 %   08/31/20 2145 132/74 -- -- 61 -- 99 %   08/31/20 2131 -- -- -- -- -- 100 %   08/31/20 2130 (!) 142/79  -- -- 62 -- 98 %   08/31/20 2116 -- -- -- -- -- 100 %   08/31/20 2115 127/69 -- -- 52 -- --   08/31/20 1750 136/80 97.4  F (36.3  C) Temporal 59 16 98 %     Physical Exam  SKIN:  Warm, dry.  No no erythema over the anterior neck.  HEMATOLOGIC/IMMUNOLOGIC/LYMPHATIC:  No pallor.  No cervical adenopathy.  HENT: Gravelly sounding voice, nontender soft tissues of the anterior neck, no posterior oropharyngeal inflammation.  No stridor.  EYES:  Conjunctivae normal.  CARDIOVASCULAR:  Regular rate and rhythm.  No murmur.  RESPIRATORY:  No respiratory distress, breath sounds equal and normal.  GASTROINTESTINAL:  Soft, nontender abdomen with active bowel sounds.  No distention.  No palpable mass.  MUSCULOSKELETAL: Normal body habitus.  NEUROLOGIC:  Alert, conversant.  PSYCHIATRIC:  Normal mood.    Emergency Department Course     ECG:  Indication: Throat pain  Time: 1757  Vent. Rate 59 bpm. AK interval 126. QRS duration 102. QT/QTc 428/423. P-R-T axis 82 84 65.   Sinus Bradycardia with sinus arrhythmia, possible left atrial enlargement, junctional ST depression, probably normal  No change from 8/12/19.   Read time: 2034    Imaging:  Radiology findings were communicated with the patient who voiced understanding of the findings.    CT Chest w/ IV contrast:   1.  Several nonspecific nodules right lung base Follow-up according to guidelines below.   2.  No infiltrate or pleural effusion, as per radiology.    CT Soft Tissue Neck w/ IV contrast:   1.  Slightly edematous epiglottis. This may be reactive due to reflux. Correlate for symptoms of supraglottitis.   2.  Edematous soft palate. Recommend direct visualization. No distinct mass is identified.   3.  Generalized cachexia, as per radiology.    Laboratory:  Laboratory findings were communicated with the patient who voiced understanding of the findings.    CBC: WBC: 6.3, HGB: 16.5, PLT: 254    CMP: WNL (Creatinine: 0.99)    Lipase: 146    Troponin (Collected 2025):  <0.015    Procedures     Laryngoscopy     PHYSICIAN: Dr. Sterling    INDICATION:  Throat pain    MEDICATIONS: 3 mL of aerosolized 4% lidocaine solution to the left nasal passage    CONSENT: Verbal consent was obtained from the patient.  The fiberoptic scope was advanced through the nare down to the level of the glottic structures.    FINDINGS:   1.  The epiglottis was difficult to visualize but revealed swelling.  2.  Swelling of the supraglottic tissues.  3.  No foreign body.    OUTCOME: There were no complications to the procedure.  The patient tolerated the procedure relatively well.     Interventions:  2034: GI cocktail, 30 mL, Oral    Emergency Department Course:  Past medical records, nursing notes, and vitals reviewed.    2023: I performed an exam of the patient as documented above.     EKG obtained in the ED, see results above.   IV was inserted and blood was drawn for laboratory testing, results above.  The patient was sent for a CT while in the emergency department, results above.     2052: I rechecked the patient and discussed the results of his workup thus far. The GI cocktail was not effective at relieving  his pain.    2132:   I rechecked the patient, who agrees to proceed with CT.    2325:   I spoke with Dr. Odonnell of the Radiology service regarding patient's CT.    2329:   I spoke with Dr. Bangura of the ENT service regarding patient's presentation, findings, and plan of care.    2335:   I rechecked the patient and discussed the results of his workup thus far    Findings and plan explained to the Patient. Patient discharged home with instructions regarding supportive care, medications, and reasons to return. The importance of close follow-up was reviewed. The patient was prescribed Augmentin.     I personally reviewed the laboratory and imaging results with the Patient and answered all related questions prior to discharge.     Impression & Plan     Medical Decision Making:  This patient presents with a voice  change and sore throat for approximately 2 weeks. Given his age and theses symptoms I was concerned about neoplastic process so the above evaluation was undertaken. Initially considered just exacerbation of his acid reflux but GI cocktail did not change his symptoms at all. Imaging of the chest was relatively reassuring with pulmonary nodules but no signs of any active neoplastic disease. The neck scan revealed signs of inflammation of the supraglottic tissues including epiglottis. I did perform direct fiberoptic laryngoscopy of that region. The tissues were somewhat boggy but not erythematous or terribly inflamed. The patient has no respiratory distress, lacks fever, and has a normal WBC count. I consulted Dr. Bangura with ENT who thought the patient could be discharged given the symptoms have not been progressive over time. He advised a one time dose of decadron, 10 mg, and a course of Augmentin. Clinic follow-up. The patient is comfortable with this plan as am I. Advise return if feeling worse, particularly from a respiratory standpoint.      Diagnosis:    ICD-10-CM    1. Supraglottitis without airway obstruction  J04.30        Disposition:  Discharged to home.    Discharge Medications:  Discharge Medication List as of 9/1/2020 12:10 AM      START taking these medications    Details   amoxicillin-clavulanate (AUGMENTIN) 875-125 MG tablet Take 1 tablet by mouth 2 times daily for 10 days, Disp-20 tablet,R-0, E-Prescribe             Scribe Disclosure:  I, Onelia Palomares, am serving as a scribe at 8:24 PM on 8/31/2020 to document services personally performed by Liam Sterling MD based on my observations and the provider's statements to me.      Liam Sterling MD  09/01/20 [id number]

## 2020-09-04 ENCOUNTER — TRANSFERRED RECORDS (OUTPATIENT)
Dept: HEALTH INFORMATION MANAGEMENT | Facility: CLINIC | Age: 70
End: 2020-09-04

## 2020-10-26 DIAGNOSIS — K21.9 GASTROESOPHAGEAL REFLUX DISEASE WITHOUT ESOPHAGITIS: ICD-10-CM

## 2020-10-28 NOTE — TELEPHONE ENCOUNTER
Called Pt:    No answer, left VM to return call to clinic to schedule a visit     Pt Reps: Please schedule- please inquire if Pt needs refills prior to scheduled visit     Kendra STUART RN

## 2020-10-29 NOTE — TELEPHONE ENCOUNTER
Hiatal hernia     OTC does not work for him - needs DR     Advised visit     Going out of town in 3 weeks     Future phone visit scheduled     Narcisa ACHARYA RN

## 2020-11-29 ENCOUNTER — HEALTH MAINTENANCE LETTER (OUTPATIENT)
Age: 70
End: 2020-11-29

## 2021-01-12 DIAGNOSIS — K21.9 GASTROESOPHAGEAL REFLUX DISEASE WITHOUT ESOPHAGITIS: ICD-10-CM

## 2021-01-19 DIAGNOSIS — K21.9 GASTROESOPHAGEAL REFLUX DISEASE WITHOUT ESOPHAGITIS: ICD-10-CM

## 2021-03-03 ENCOUNTER — MYC MEDICAL ADVICE (OUTPATIENT)
Dept: FAMILY MEDICINE | Facility: CLINIC | Age: 71
End: 2021-03-03

## 2021-03-03 DIAGNOSIS — K21.9 GASTROESOPHAGEAL REFLUX DISEASE WITHOUT ESOPHAGITIS: ICD-10-CM

## 2021-03-05 NOTE — TELEPHONE ENCOUNTER
Team - Please abstract vaccine  PCP- Please sign RX. Last seen 8/2019, plans to have px in May 2021.   Dana Dietrich, RN  [facility] RN Triage Team

## 2021-03-22 ENCOUNTER — VIRTUAL VISIT (OUTPATIENT)
Dept: FAMILY MEDICINE | Facility: CLINIC | Age: 71
End: 2021-03-22
Payer: COMMERCIAL

## 2021-03-22 DIAGNOSIS — K21.9 GASTROESOPHAGEAL REFLUX DISEASE, UNSPECIFIED WHETHER ESOPHAGITIS PRESENT: Primary | ICD-10-CM

## 2021-03-22 PROCEDURE — 99213 OFFICE O/P EST LOW 20 MIN: CPT | Mod: 95 | Performed by: INTERNAL MEDICINE

## 2021-03-22 ASSESSMENT — PATIENT HEALTH QUESTIONNAIRE - PHQ9: SUM OF ALL RESPONSES TO PHQ QUESTIONS 1-9: 0

## 2021-03-22 NOTE — PROGRESS NOTES
Urbano is a 70 year old who is being evaluated via a billable video visit.      How would you like to obtain your AVS? Milviaharthang  If the video visit is dropped, the invitation should be resent by: Text to cell phone: NHUNG  Will anyone else be joining your video visit? No    Video Start Time: He could not get video to work     Assessment & Plan    Breakthrough reflux /heartburn symptoms despite PPI therapy   An EGD would be helpful to guide therapy for his reflux disease  MAC might be tolerated better than fentanyl and versed  Gastroesophageal reflux disease, unspecified whether esophagitis present      - GASTROENTEROLOGY ADULT REF PROCEDURE ONLY; Future      16 minutes spent on the date of the encounter doing chart review, history and exam, documentation and further activities as noted above           Return in about 3 months (around 6/22/2021) for Preventive Visit.  Patient instructed to return to clinic or contact us sooner if symptoms worsen or new symptoms develop.     Andrea Cruz MD  [facility]   Urbano is a 70 year old who presents for the following health issues     HPI     Chief Complaint   Patient presents with     Follow Up     Gastroesophageal reflux disease without esophagitis     He is having some breaktrhough reflux /heartburn symptoms  He wonders if he should have another EGD  He has had a long history of reflux  He takes 20 mg or prilosec 20-30 minutes before breakfast  No dysphagia or odynophagia or weight loss  No change in stools   [facility] words   They recommend EGD, but he declined because the sedation makes him feel groggy and nauseated for several days afterward     Review of Systems   Constitutional, HEENT, cardiovascular, pulmonary, gi and gu systems are negative, except as otherwise noted.      Objective           Vitals:  No vitals were obtained today due to virtual visit.    Physical Exam   GEN:   Comfortable sounding               Video-Visit Details    Type of service:  telephone visit     Video End Time: Telephone call duration:  17 minutes     Originating Location (pt. Location): Home    Distant Location (provider location):  [facility]     Platform used for Video Visit: telephone

## 2021-04-07 ENCOUNTER — HOSPITAL ENCOUNTER (OUTPATIENT)
Facility: CLINIC | Age: 71
End: 2021-04-07
Attending: INTERNAL MEDICINE | Admitting: INTERNAL MEDICINE
Payer: COMMERCIAL

## 2021-04-29 DIAGNOSIS — Z11.59 ENCOUNTER FOR SCREENING FOR OTHER VIRAL DISEASES: ICD-10-CM

## 2021-06-02 ENCOUNTER — OFFICE VISIT (OUTPATIENT)
Dept: FAMILY MEDICINE | Facility: CLINIC | Age: 71
End: 2021-06-02
Payer: COMMERCIAL

## 2021-06-02 VITALS
TEMPERATURE: 96.4 F | SYSTOLIC BLOOD PRESSURE: 106 MMHG | WEIGHT: 162 LBS | HEART RATE: 61 BPM | HEIGHT: 74 IN | BODY MASS INDEX: 20.79 KG/M2 | DIASTOLIC BLOOD PRESSURE: 66 MMHG | OXYGEN SATURATION: 99 %

## 2021-06-02 DIAGNOSIS — Z00.00 ROUTINE GENERAL MEDICAL EXAMINATION AT A HEALTH CARE FACILITY: Primary | ICD-10-CM

## 2021-06-02 DIAGNOSIS — Z12.5 PROSTATE CANCER SCREENING: ICD-10-CM

## 2021-06-02 DIAGNOSIS — K21.9 GASTROESOPHAGEAL REFLUX DISEASE, UNSPECIFIED WHETHER ESOPHAGITIS PRESENT: ICD-10-CM

## 2021-06-02 LAB — PSA SERPL-ACNC: 3.2 UG/L (ref 0–4)

## 2021-06-02 PROCEDURE — 99397 PER PM REEVAL EST PAT 65+ YR: CPT | Performed by: INTERNAL MEDICINE

## 2021-06-02 PROCEDURE — 36415 COLL VENOUS BLD VENIPUNCTURE: CPT | Performed by: INTERNAL MEDICINE

## 2021-06-02 PROCEDURE — G0103 PSA SCREENING: HCPCS | Performed by: INTERNAL MEDICINE

## 2021-06-02 ASSESSMENT — MIFFLIN-ST. JEOR: SCORE: 1552.33

## 2021-06-02 ASSESSMENT — ACTIVITIES OF DAILY LIVING (ADL): CURRENT_FUNCTION: NO ASSISTANCE NEEDED

## 2021-06-02 NOTE — RESULT ENCOUNTER NOTE
The following letter pertains to your most recent diagnostic tests:    Good news! -Your prostate specific antigen (PSA) test result returned normal.        Sincerely,    Dr. Cruz

## 2021-06-02 NOTE — PROGRESS NOTES
"SUBJECTIVE:   Urbano Magallanes is a 70 year old male who presents for Preventive Visit.      Patient has been advised of split billing requirements and indicates understanding: Yes   Are you in the first 12 months of your Medicare coverage?  No    Healthy Habits:     In general, how would you rate your overall health?  Very good    Frequency of exercise:  4-5 days/week    Duration of exercise:  30-45 minutes    Do you usually eat at least 4 servings of fruit and vegetables a day, include whole grains    & fiber and avoid regularly eating high fat or \"junk\" foods?  Yes    Taking medications regularly:  Yes    Barriers to taking medications:  Other    Medication side effects:  Other    Ability to successfully perform activities of daily living:  No assistance needed    Home Safety:  No safety concerns identified    Hearing Impairment:  No hearing concerns    In the past 6 months, have you been bothered by leaking of urine?  No    In general, how would you rate your overall mental or emotional health?  Very good      PHQ-2 Total Score: 0    Additional concerns today:  No    His reflux symptoms resolved when stopped doing a certain kind of sit ups  He denies dysphagia, odynophagia, weight loss  He continues to take omeprazole   [facility]   Do you feel safe in your environment? Yes    Have you ever done Advance Care Planning? (For example, a Health Directive, POLST, or a discussion with a medical provider or your loved ones about your wishes): Yes, advance care planning is on file.       Fall risk  Fallen 2 or more times in the past year?: No  Any fall with injury in the past year?: No    Cognitive Screening   1) Repeat 3 items (Leader, Season, Table)    2) Clock draw: NORMAL  3) 3 item recall: Recalls 3 objects  Results: 3 items recalled: COGNITIVE IMPAIRMENT LESS LIKELY    Mini-CogTM Copyright S Ning. Licensed by the author for use in [facility]; " reprinted with permission (soob@.[facility]). All rights reserved.      Do you have sleep apnea, excessive snoring or daytime drowsiness?: no    Reviewed and updated as needed this visit by clinical staff  Tobacco  Allergies  Meds              Reviewed and updated as needed this visit by Provider                Social History     Tobacco Use     Smoking status: Never Smoker     Smokeless tobacco: Never Used   Substance Use Topics     Alcohol use: Yes     Alcohol/week: 4.0 standard drinks     Comment: a few beers once or twicw per week     If you drink alcohol do you typically have >3 drinks per day or >7 drinks per week? No    Alcohol Use 7/31/2019   Prescreen: >3 drinks/day or >7 drinks/week? No         Current providers sharing in care for this patient include:   Patient Care Team:  Andrea Cruz MD as PCP - General (Internal Medicine)  Andrea Cruz MD as Assigned PCP    The following health maintenance items are reviewed in Epic and correct as of today:  Health Maintenance Due   Topic Date Due     ANNUAL REVIEW OF  ORDERS  Never done     DEPRESSION ACTION PLAN  Never done     AORTIC ANEURYSM SCREENING (SYSTEM ASSIGNED)  Never done     MEDICARE ANNUAL WELLNESS VISIT  07/31/2020     Patient Active Problem List   Diagnosis     Right inguinal pain     Gastroesophageal reflux disease without esophagitis     Actinic keratosis     Backache     Depressive disorder     Elevated PSA     Hearing loss     History of basal cell carcinoma     History of disease of skin and subcutaneous tissue     History of dysplastic nevus     History of squamous cell carcinoma of skin     Inguinal hernia     S/P appendectomy     Past Surgical History:   Procedure Laterality Date     APPENDECTOMY       COLONOSCOPY  2013     ESOPHAGOSCOPY, GASTROSCOPY, DUODENOSCOPY (EGD), COMBINED N/A 6/6/2017    Procedure: COMBINED ESOPHAGOSCOPY, GASTROSCOPY, DUODENOSCOPY (EGD), BIOPSY SINGLE OR MULTIPLE;  gastroscopy;  Surgeon: Jefferson Palmer MD;   "Location:  GI     HERNIA REPAIR  , , 2015    [facility]      MOHS MICROGRAPHIC PROCEDURE      right scalp; basal cell carcinoma       Social History     Tobacco Use     Smoking status: Never Smoker     Smokeless tobacco: Never Used   Substance Use Topics     Alcohol use: Yes     Alcohol/week: 4.0 standard drinks     Comment: a few beers once or twicw per week     Family History   Problem Relation Age of Onset     Substance Abuse Mother      Depression Mother      Other Cancer Father         kidney cancer     Substance Abuse Father      Cancer Father         Kidney cancer     Depression Son      Depression Son          from suicide         Current Outpatient Medications   Medication Sig Dispense Refill     Cyanocobalamin (VITAMIN B 12 PO) Take by mouth once a week       Glucosamine HCl-MSM (MSM GLUCOSAMINE PO)        magnesium citrate solution Take 296 mLs by mouth every other day       omeprazole (PRILOSEC) 20 MG DR capsule Take 1 capsule (20 mg) by mouth daily Due for appointment. Please schedule: [phone] 90 capsule 0     VITAMIN D, CHOLECALCIFEROL, PO Take 5,000 Units by mouth daily        Allergies   Allergen Reactions     No Known Allergies              Review of Systems  Constitutional, HEENT, cardiovascular, pulmonary, gi and gu systems are negative, except as otherwise noted.    OBJECTIVE:   /66 (BP Location: Right arm, Patient Position: Sitting, Cuff Size: Adult Regular)   Pulse 61   Temp 96.4  F (35.8  C) (Temporal)   Ht 1.876 m (6' 1.86\")   Wt 73.5 kg (162 lb)   SpO2 99%   BMI 20.88 kg/m   Estimated body mass index is 20.88 kg/m  as calculated from the following:    Height as of this encounter: 1.876 m (6' 1.86\").    Weight as of this encounter: 73.5 kg (162 lb).  Physical Exam  GENERAL: healthy, alert and no distress  EYES: Eyes grossly normal to inspection, PERRL and conjunctivae and sclerae normal  HENT: ear canals and TM's normal, nose and mouth without ulcers or lesions  NECK: " "no adenopathy, no asymmetry, masses, or scars and thyroid normal to palpation  RESP: lungs clear to auscultation - no rales, rhonchi or wheezes  CV: regular rate and rhythm, normal S1 S2, no S3 or S4, no murmur, click or rub, no peripheral edema and peripheral pulses strong  ABDOMEN: soft, nontender, no hepatosplenomegaly, no masses and bowel sounds normal  RECTAL: normal sphincter tone, no rectal masses, prostate normal size, smooth, nontender without nodules or masses  MS: no gross musculoskeletal defects noted, no edema  SKIN: no suspicious lesions or rashes  NEURO: Normal strength and tone, mentation intact and speech normal  PSYCH: mentation appears normal, affect normal/bright    Labs pending     ASSESSMENT / PLAN:       ICD-10-CM    1. Routine general medical examination at a health care facility  Z00.00    2. Gastroesophageal reflux disease, unspecified whether esophagitis present  K21.9    3. Prostate cancer screening  Z12.5 Prostate spec antigen screen     Discussed pros and cons of PSA screening; understanding the risk he would like to have the test drawn    Reflux symptoms currently well controlled     Patient has been advised of split billing requirements and indicates understanding: Yes  COUNSELING:  Reviewed preventive health counseling, as reflected in patient instructions  Special attention given to:       Consider AAA screening for ages 65-75 and smoking history ([facility])       Regular exercise       Healthy diet/nutrition       Immunizations    Vaccines are up to date          Hepatitis C screening; done       Consider lung cancer screening for ages 55-80 years and 30 pack-year smoking history ; never smoker        Colon cancer screening; repeat 2014       Prostate cancer screening; PSA today     Estimated body mass index is 20.88 kg/m  as calculated from the following:    Height as of this encounter: 1.876 m (6' 1.86\").    Weight " as of this encounter: 73.5 kg (162 lb).        He reports that he has never smoked. He has never used smokeless tobacco.      Appropriate preventive services were discussed with this patient, including applicable screening as appropriate for cardiovascular disease, diabetes, osteopenia/osteoporosis, and glaucoma.  As appropriate for age/gender, discussed screening for colorectal cancer, prostate cancer, breast cancer, and cervical cancer. Checklist reviewing preventive services available has been given to the patient.    Reviewed patients plan of care and provided an AVS. The Basic Care Plan (routine screening as documented in Health Maintenance) for Urbano meets the Care Plan requirement. This Care Plan has been established and reviewed with the Patient.    Counseling Resources:  ATP IV Guidelines  Pooled Cohorts Equation Calculator  Breast Cancer Risk Calculator  Breast Cancer: Medication to Reduce Risk  FRAX Risk Assessment  ICSI Preventive Guidelines  Dietary Guidelines for Americans, 2010  USDA's MyPlate  ASA Prophylaxis  Lung CA Screening    Andrea Cruz MD  [facility]    Identified Health Risks:

## 2021-09-25 ENCOUNTER — HEALTH MAINTENANCE LETTER (OUTPATIENT)
Age: 71
End: 2021-09-25

## 2022-08-21 ENCOUNTER — HEALTH MAINTENANCE LETTER (OUTPATIENT)
Age: 72
End: 2022-08-21

## 2022-12-26 ENCOUNTER — HEALTH MAINTENANCE LETTER (OUTPATIENT)
Age: 72
End: 2022-12-26

## 2023-09-17 ENCOUNTER — HEALTH MAINTENANCE LETTER (OUTPATIENT)
Age: 73
End: 2023-09-17

## (undated) RX ORDER — FENTANYL CITRATE 50 UG/ML
INJECTION, SOLUTION INTRAMUSCULAR; INTRAVENOUS
Status: DISPENSED
Start: 2017-06-06